# Patient Record
Sex: FEMALE | Race: WHITE | NOT HISPANIC OR LATINO | Employment: OTHER | URBAN - METROPOLITAN AREA
[De-identification: names, ages, dates, MRNs, and addresses within clinical notes are randomized per-mention and may not be internally consistent; named-entity substitution may affect disease eponyms.]

---

## 2017-01-01 ENCOUNTER — OFFICE VISIT (OUTPATIENT)
Dept: FAMILY MEDICINE | Facility: CLINIC | Age: 82
End: 2017-01-01
Payer: MEDICARE

## 2017-01-01 ENCOUNTER — PATIENT MESSAGE (OUTPATIENT)
Dept: ELECTROPHYSIOLOGY | Facility: CLINIC | Age: 82
End: 2017-01-01

## 2017-01-01 ENCOUNTER — OFFICE VISIT (OUTPATIENT)
Dept: INTERNAL MEDICINE | Facility: CLINIC | Age: 82
End: 2017-01-01
Payer: MEDICARE

## 2017-01-01 ENCOUNTER — OFFICE VISIT (OUTPATIENT)
Dept: FAMILY MEDICINE | Facility: CLINIC | Age: 82
End: 2017-01-01
Attending: FAMILY MEDICINE
Payer: MEDICARE

## 2017-01-01 ENCOUNTER — HOSPITAL ENCOUNTER (EMERGENCY)
Facility: HOSPITAL | Age: 82
Discharge: HOME | End: 2017-03-01
Attending: EMERGENCY MEDICINE
Payer: MEDICARE

## 2017-01-01 ENCOUNTER — OFFICE VISIT (OUTPATIENT)
Dept: ELECTROPHYSIOLOGY | Facility: CLINIC | Age: 82
End: 2017-01-01
Payer: MEDICARE

## 2017-01-01 ENCOUNTER — CLINICAL SUPPORT (OUTPATIENT)
Dept: ELECTROPHYSIOLOGY | Facility: CLINIC | Age: 82
End: 2017-01-01
Payer: MEDICARE

## 2017-01-01 ENCOUNTER — TELEPHONE (OUTPATIENT)
Dept: FAMILY MEDICINE | Facility: CLINIC | Age: 82
End: 2017-01-01

## 2017-01-01 ENCOUNTER — LAB VISIT (OUTPATIENT)
Dept: LAB | Facility: HOSPITAL | Age: 82
End: 2017-01-01
Attending: FAMILY MEDICINE
Payer: MEDICARE

## 2017-01-01 ENCOUNTER — PATIENT MESSAGE (OUTPATIENT)
Dept: FAMILY MEDICINE | Facility: CLINIC | Age: 82
End: 2017-01-01

## 2017-01-01 ENCOUNTER — TELEPHONE (OUTPATIENT)
Dept: ELECTROPHYSIOLOGY | Facility: CLINIC | Age: 82
End: 2017-01-01

## 2017-01-01 ENCOUNTER — LAB VISIT (OUTPATIENT)
Dept: LAB | Facility: HOSPITAL | Age: 82
End: 2017-01-01
Attending: INTERNAL MEDICINE
Payer: MEDICARE

## 2017-01-01 ENCOUNTER — HOSPITAL ENCOUNTER (OUTPATIENT)
Dept: CARDIOLOGY | Facility: CLINIC | Age: 82
Discharge: HOME | End: 2017-11-08
Payer: MEDICARE

## 2017-01-01 VITALS
BODY MASS INDEX: 38.22 KG/M2 | HEART RATE: 62 BPM | WEIGHT: 194.69 LBS | OXYGEN SATURATION: 90 % | HEIGHT: 60 IN | DIASTOLIC BLOOD PRESSURE: 64 MMHG | SYSTOLIC BLOOD PRESSURE: 107 MMHG

## 2017-01-01 VITALS
WEIGHT: 189.81 LBS | BODY MASS INDEX: 37.27 KG/M2 | OXYGEN SATURATION: 98 % | SYSTOLIC BLOOD PRESSURE: 121 MMHG | DIASTOLIC BLOOD PRESSURE: 64 MMHG | HEART RATE: 82 BPM | HEIGHT: 60 IN

## 2017-01-01 VITALS
HEIGHT: 60 IN | OXYGEN SATURATION: 100 % | DIASTOLIC BLOOD PRESSURE: 60 MMHG | BODY MASS INDEX: 35.34 KG/M2 | HEART RATE: 69 BPM | TEMPERATURE: 98 F | SYSTOLIC BLOOD PRESSURE: 163 MMHG | RESPIRATION RATE: 16 BRPM | WEIGHT: 180 LBS

## 2017-01-01 VITALS
HEART RATE: 69 BPM | DIASTOLIC BLOOD PRESSURE: 60 MMHG | SYSTOLIC BLOOD PRESSURE: 127 MMHG | WEIGHT: 193.56 LBS | HEIGHT: 60 IN | BODY MASS INDEX: 38 KG/M2

## 2017-01-01 VITALS
DIASTOLIC BLOOD PRESSURE: 90 MMHG | WEIGHT: 192.44 LBS | HEIGHT: 60 IN | SYSTOLIC BLOOD PRESSURE: 176 MMHG | HEART RATE: 60 BPM | BODY MASS INDEX: 37.78 KG/M2

## 2017-01-01 VITALS
WEIGHT: 189.38 LBS | OXYGEN SATURATION: 92 % | DIASTOLIC BLOOD PRESSURE: 78 MMHG | HEART RATE: 60 BPM | HEIGHT: 60 IN | BODY MASS INDEX: 37.18 KG/M2 | SYSTOLIC BLOOD PRESSURE: 139 MMHG

## 2017-01-01 DIAGNOSIS — Z95.0 CARDIAC PACEMAKER IN SITU: ICD-10-CM

## 2017-01-01 DIAGNOSIS — D53.9 NUTRITIONAL ANEMIA: ICD-10-CM

## 2017-01-01 DIAGNOSIS — D64.9 ANEMIA, UNSPECIFIED TYPE: ICD-10-CM

## 2017-01-01 DIAGNOSIS — M81.0 OSTEOPOROSIS: ICD-10-CM

## 2017-01-01 DIAGNOSIS — I13.0 BENIGN HYPERTENSIVE HEART AND KIDNEY DISEASE WITH DIASTOLIC CHF, NYHA CLASS II AND CKD STAGE III: ICD-10-CM

## 2017-01-01 DIAGNOSIS — N18.30 CKD (CHRONIC KIDNEY DISEASE), STAGE III: ICD-10-CM

## 2017-01-01 DIAGNOSIS — N25.81 SECONDARY HYPERPARATHYROIDISM: ICD-10-CM

## 2017-01-01 DIAGNOSIS — Z83.49 FAMILY HISTORY OF THYROID DISEASE: ICD-10-CM

## 2017-01-01 DIAGNOSIS — R10.9 ABDOMINAL PAIN: ICD-10-CM

## 2017-01-01 DIAGNOSIS — I13.0 BENIGN HYPERTENSIVE HEART AND KIDNEY DISEASE WITH DIASTOLIC CHF, NYHA CLASS II AND CKD STAGE III: Primary | ICD-10-CM

## 2017-01-01 DIAGNOSIS — N18.30 BENIGN HYPERTENSIVE HEART AND KIDNEY DISEASE WITH DIASTOLIC CHF, NYHA CLASS II AND CKD STAGE III: ICD-10-CM

## 2017-01-01 DIAGNOSIS — I10 HTN (HYPERTENSION), BENIGN: ICD-10-CM

## 2017-01-01 DIAGNOSIS — N18.30 BENIGN HYPERTENSIVE HEART AND KIDNEY DISEASE WITH DIASTOLIC CHF, NYHA CLASS II AND CKD STAGE III: Primary | ICD-10-CM

## 2017-01-01 DIAGNOSIS — J01.01 ACUTE RECURRENT MAXILLARY SINUSITIS: Primary | ICD-10-CM

## 2017-01-01 DIAGNOSIS — R53.83 FATIGUE, UNSPECIFIED TYPE: ICD-10-CM

## 2017-01-01 DIAGNOSIS — A09 DIARRHEA OF INFECTIOUS ORIGIN: ICD-10-CM

## 2017-01-01 DIAGNOSIS — I50.30 BENIGN HYPERTENSIVE HEART AND KIDNEY DISEASE WITH DIASTOLIC CHF, NYHA CLASS II AND CKD STAGE III: Primary | ICD-10-CM

## 2017-01-01 DIAGNOSIS — R79.89 ABNORMAL TSH: ICD-10-CM

## 2017-01-01 DIAGNOSIS — M81.0 OSTEOPOROSIS, UNSPECIFIED OSTEOPOROSIS TYPE, UNSPECIFIED PATHOLOGICAL FRACTURE PRESENCE: ICD-10-CM

## 2017-01-01 DIAGNOSIS — I44.1 MOBITZ TYPE 1 SECOND DEGREE AV BLOCK: ICD-10-CM

## 2017-01-01 DIAGNOSIS — E78.2 HYPERLIPIDEMIA, MIXED: ICD-10-CM

## 2017-01-01 DIAGNOSIS — K21.9 GASTROESOPHAGEAL REFLUX DISEASE WITHOUT ESOPHAGITIS: ICD-10-CM

## 2017-01-01 DIAGNOSIS — I26.99 OTHER PULMONARY EMBOLISM WITHOUT ACUTE COR PULMONALE, UNSPECIFIED CHRONICITY: ICD-10-CM

## 2017-01-01 DIAGNOSIS — I51.89 DIASTOLIC DYSFUNCTION: ICD-10-CM

## 2017-01-01 DIAGNOSIS — E66.9 OBESITY, CLASS II, BMI 35-39.9: ICD-10-CM

## 2017-01-01 DIAGNOSIS — I44.1 AV BLOCK, MOBITZ 1: ICD-10-CM

## 2017-01-01 DIAGNOSIS — K30 INDIGESTION: Primary | ICD-10-CM

## 2017-01-01 DIAGNOSIS — M17.0 PRIMARY OSTEOARTHRITIS OF BOTH KNEES: ICD-10-CM

## 2017-01-01 DIAGNOSIS — E66.01 SEVERE OBESITY (BMI 35.0-35.9 WITH COMORBIDITY): ICD-10-CM

## 2017-01-01 DIAGNOSIS — R19.7 DIARRHEA, UNSPECIFIED TYPE: Primary | ICD-10-CM

## 2017-01-01 DIAGNOSIS — I49.5 SSS (SICK SINUS SYNDROME): ICD-10-CM

## 2017-01-01 DIAGNOSIS — R26.81 GAIT INSTABILITY: ICD-10-CM

## 2017-01-01 DIAGNOSIS — Z95.0 CARDIAC PACEMAKER IN SITU: Primary | ICD-10-CM

## 2017-01-01 DIAGNOSIS — I44.1 AV BLOCK, MOBITZ 1: Primary | ICD-10-CM

## 2017-01-01 DIAGNOSIS — I50.30 BENIGN HYPERTENSIVE HEART AND KIDNEY DISEASE WITH DIASTOLIC CHF, NYHA CLASS II AND CKD STAGE III: ICD-10-CM

## 2017-01-01 DIAGNOSIS — D51.9 ANEMIA DUE TO VITAMIN B12 DEFICIENCY, UNSPECIFIED B12 DEFICIENCY TYPE: ICD-10-CM

## 2017-01-01 DIAGNOSIS — D64.9 ANEMIA, UNSPECIFIED TYPE: Primary | ICD-10-CM

## 2017-01-01 DIAGNOSIS — I44.1 MOBITZ TYPE 1 SECOND DEGREE AV BLOCK: Primary | ICD-10-CM

## 2017-01-01 DIAGNOSIS — I44.2 CHB (COMPLETE HEART BLOCK): ICD-10-CM

## 2017-01-01 LAB
25(OH)D3+25(OH)D2 SERPL-MCNC: 31 NG/ML
25(OH)D3+25(OH)D2 SERPL-MCNC: 44 NG/ML
ALBUMIN SERPL BCP-MCNC: 2.8 G/DL
ALBUMIN SERPL BCP-MCNC: 3.2 G/DL
ALBUMIN SERPL BCP-MCNC: 3.4 G/DL
ALP SERPL-CCNC: 101 U/L
ALP SERPL-CCNC: 101 U/L
ALP SERPL-CCNC: 75 U/L
ALT SERPL W/O P-5'-P-CCNC: 23 U/L
ALT SERPL W/O P-5'-P-CCNC: 8 U/L
ALT SERPL W/O P-5'-P-CCNC: 9 U/L
ANION GAP SERPL CALC-SCNC: 8 MMOL/L
ANION GAP SERPL CALC-SCNC: 9 MMOL/L
ANION GAP SERPL CALC-SCNC: 9 MMOL/L
AST SERPL-CCNC: 12 U/L
AST SERPL-CCNC: 15 U/L
AST SERPL-CCNC: 17 U/L
BACTERIA #/AREA URNS HPF: ABNORMAL /HPF
BASOPHILS # BLD AUTO: 0.02 K/UL
BASOPHILS # BLD AUTO: 0.02 K/UL
BASOPHILS # BLD AUTO: 0.03 K/UL
BASOPHILS NFR BLD: 0.2 %
BASOPHILS NFR BLD: 0.2 %
BASOPHILS NFR BLD: 0.4 %
BILIRUB SERPL-MCNC: 0.3 MG/DL
BILIRUB SERPL-MCNC: 0.3 MG/DL
BILIRUB SERPL-MCNC: 0.6 MG/DL
BILIRUB UR QL STRIP: NEGATIVE
BUN SERPL-MCNC: 20 MG/DL
BUN SERPL-MCNC: 20 MG/DL
BUN SERPL-MCNC: 23 MG/DL
CALCIUM SERPL-MCNC: 8.9 MG/DL
CALCIUM SERPL-MCNC: 9.3 MG/DL
CALCIUM SERPL-MCNC: 9.4 MG/DL
CHLORIDE SERPL-SCNC: 104 MMOL/L
CHLORIDE SERPL-SCNC: 105 MMOL/L
CHLORIDE SERPL-SCNC: 107 MMOL/L
CHOLEST/HDLC SERPL: 4.3 {RATIO}
CLARITY UR: CLEAR
CO2 SERPL-SCNC: 23 MMOL/L
CO2 SERPL-SCNC: 26 MMOL/L
CO2 SERPL-SCNC: 28 MMOL/L
COLOR UR: YELLOW
CREAT SERPL-MCNC: 1 MG/DL
CREAT SERPL-MCNC: 1.1 MG/DL
CREAT SERPL-MCNC: 1.1 MG/DL
DIFFERENTIAL METHOD: ABNORMAL
EOSINOPHIL # BLD AUTO: 0.1 K/UL
EOSINOPHIL NFR BLD: 0.8 %
EOSINOPHIL NFR BLD: 1.1 %
EOSINOPHIL NFR BLD: 1.3 %
ERYTHROCYTE [DISTWIDTH] IN BLOOD BY AUTOMATED COUNT: 12.2 %
ERYTHROCYTE [DISTWIDTH] IN BLOOD BY AUTOMATED COUNT: 12.6 %
ERYTHROCYTE [DISTWIDTH] IN BLOOD BY AUTOMATED COUNT: 12.7 %
EST. GFR  (AFRICAN AMERICAN): 52 ML/MIN/1.73 M^2
EST. GFR  (AFRICAN AMERICAN): 52 ML/MIN/1.73 M^2
EST. GFR  (AFRICAN AMERICAN): 58.5 ML/MIN/1.73 M^2
EST. GFR  (NON AFRICAN AMERICAN): 45 ML/MIN/1.73 M^2
EST. GFR  (NON AFRICAN AMERICAN): 45 ML/MIN/1.73 M^2
EST. GFR  (NON AFRICAN AMERICAN): 50.8 ML/MIN/1.73 M^2
GLUCOSE SERPL-MCNC: 93 MG/DL
GLUCOSE SERPL-MCNC: 93 MG/DL
GLUCOSE SERPL-MCNC: 99 MG/DL
GLUCOSE UR QL STRIP: NEGATIVE
HCT VFR BLD AUTO: 36.2 %
HCT VFR BLD AUTO: 39 %
HCT VFR BLD AUTO: 39.7 %
HDL/CHOLESTEROL RATIO: 23.4 %
HDLC SERPL-MCNC: 197 MG/DL
HDLC SERPL-MCNC: 46 MG/DL
HGB BLD-MCNC: 11.5 G/DL
HGB BLD-MCNC: 12.3 G/DL
HGB BLD-MCNC: 12.5 G/DL
HGB UR QL STRIP: ABNORMAL
KETONES UR QL STRIP: NEGATIVE
LDLC SERPL CALC-MCNC: 109.8 MG/DL
LEUKOCYTE ESTERASE UR QL STRIP: ABNORMAL
LIPASE SERPL-CCNC: <3 U/L
LYMPHOCYTES # BLD AUTO: 1.3 K/UL
LYMPHOCYTES # BLD AUTO: 1.9 K/UL
LYMPHOCYTES # BLD AUTO: 2 K/UL
LYMPHOCYTES NFR BLD: 13.9 %
LYMPHOCYTES NFR BLD: 24.3 %
LYMPHOCYTES NFR BLD: 24.8 %
MCH RBC QN AUTO: 31.1 PG
MCH RBC QN AUTO: 31.4 PG
MCH RBC QN AUTO: 31.8 PG
MCHC RBC AUTO-ENTMCNC: 31.5 %
MCHC RBC AUTO-ENTMCNC: 31.5 %
MCHC RBC AUTO-ENTMCNC: 31.8 %
MCV RBC AUTO: 100 FL
MCV RBC AUTO: 100 FL
MCV RBC AUTO: 99 FL
MICROSCOPIC COMMENT: ABNORMAL
MONOCYTES # BLD AUTO: 0.4 K/UL
MONOCYTES # BLD AUTO: 0.4 K/UL
MONOCYTES # BLD AUTO: 1.1 K/UL
MONOCYTES NFR BLD: 11.9 %
MONOCYTES NFR BLD: 4.5 %
MONOCYTES NFR BLD: 5.4 %
NEUTROPHILS # BLD AUTO: 5.1 K/UL
NEUTROPHILS # BLD AUTO: 5.7 K/UL
NEUTROPHILS # BLD AUTO: 6.6 K/UL
NEUTROPHILS NFR BLD: 67.8 %
NEUTROPHILS NFR BLD: 69.3 %
NEUTROPHILS NFR BLD: 73 %
NITRITE UR QL STRIP: NEGATIVE
NONHDLC SERPL-MCNC: 151 MG/DL
PH UR STRIP: 6 [PH] (ref 5–8)
PLATELET # BLD AUTO: 201 K/UL
PLATELET # BLD AUTO: 291 K/UL
PLATELET # BLD AUTO: 365 K/UL
PMV BLD AUTO: 10 FL
PMV BLD AUTO: 10.8 FL
PMV BLD AUTO: 11 FL
POTASSIUM SERPL-SCNC: 4.1 MMOL/L
POTASSIUM SERPL-SCNC: 4.5 MMOL/L
POTASSIUM SERPL-SCNC: 4.7 MMOL/L
PROT SERPL-MCNC: 5.9 G/DL
PROT SERPL-MCNC: 6.2 G/DL
PROT SERPL-MCNC: 6.5 G/DL
PROT UR QL STRIP: NEGATIVE
PTH-INTACT SERPL-MCNC: 82 PG/ML
RBC # BLD AUTO: 3.62 M/UL
RBC # BLD AUTO: 3.92 M/UL
RBC # BLD AUTO: 4.02 M/UL
RBC #/AREA URNS HPF: 4 /HPF (ref 0–4)
SODIUM SERPL-SCNC: 136 MMOL/L
SODIUM SERPL-SCNC: 141 MMOL/L
SODIUM SERPL-SCNC: 142 MMOL/L
SP GR UR STRIP: 1.01 (ref 1–1.03)
SQUAMOUS #/AREA URNS HPF: ABNORMAL /HPF
THYROPEROXIDASE IGG SERPL-ACNC: <6 IU/ML
TRIGL SERPL-MCNC: 206 MG/DL
TSH SERPL DL<=0.005 MIU/L-ACNC: 2.81 UIU/ML
TSH SERPL DL<=0.005 MIU/L-ACNC: 3.32 UIU/ML
URN SPEC COLLECT METH UR: ABNORMAL
UROBILINOGEN UR STRIP-ACNC: NEGATIVE EU/DL
VIT B12 SERPL-MCNC: 405 PG/ML
WBC # BLD AUTO: 7.58 K/UL
WBC # BLD AUTO: 8.19 K/UL
WBC # BLD AUTO: 9.01 K/UL
WBC #/AREA URNS HPF: 2 /HPF (ref 0–5)

## 2017-01-01 PROCEDURE — 1126F AMNT PAIN NOTED NONE PRSNT: CPT | Mod: S$GLB,,, | Performed by: INTERNAL MEDICINE

## 2017-01-01 PROCEDURE — 1159F MED LIST DOCD IN RCRD: CPT | Mod: S$GLB,,, | Performed by: FAMILY MEDICINE

## 2017-01-01 PROCEDURE — 1159F MED LIST DOCD IN RCRD: CPT | Mod: S$GLB,,, | Performed by: INTERNAL MEDICINE

## 2017-01-01 PROCEDURE — 36415 COLL VENOUS BLD VENIPUNCTURE: CPT | Mod: PO

## 2017-01-01 PROCEDURE — 1157F ADVNC CARE PLAN IN RCRD: CPT | Mod: S$GLB,,, | Performed by: FAMILY MEDICINE

## 2017-01-01 PROCEDURE — 80053 COMPREHEN METABOLIC PANEL: CPT

## 2017-01-01 PROCEDURE — 99499 UNLISTED E&M SERVICE: CPT | Mod: S$GLB,,, | Performed by: INTERNAL MEDICINE

## 2017-01-01 PROCEDURE — 96360 HYDRATION IV INFUSION INIT: CPT

## 2017-01-01 PROCEDURE — 1126F AMNT PAIN NOTED NONE PRSNT: CPT | Mod: S$GLB,,, | Performed by: FAMILY MEDICINE

## 2017-01-01 PROCEDURE — 99499 UNLISTED E&M SERVICE: CPT | Mod: S$GLB,,, | Performed by: FAMILY MEDICINE

## 2017-01-01 PROCEDURE — 83690 ASSAY OF LIPASE: CPT

## 2017-01-01 PROCEDURE — 99999 PR PBB SHADOW E&M-EST. PATIENT-LVL III: CPT | Mod: PBBFAC,,, | Performed by: INTERNAL MEDICINE

## 2017-01-01 PROCEDURE — 1160F RVW MEDS BY RX/DR IN RCRD: CPT | Mod: S$GLB,,, | Performed by: FAMILY MEDICINE

## 2017-01-01 PROCEDURE — 99214 OFFICE O/P EST MOD 30 MIN: CPT | Mod: S$GLB,,, | Performed by: INTERNAL MEDICINE

## 2017-01-01 PROCEDURE — 93294 REM INTERROG EVL PM/LDLS PM: CPT | Mod: S$GLB,,, | Performed by: INTERNAL MEDICINE

## 2017-01-01 PROCEDURE — 82306 VITAMIN D 25 HYDROXY: CPT

## 2017-01-01 PROCEDURE — 85025 COMPLETE CBC W/AUTO DIFF WBC: CPT

## 2017-01-01 PROCEDURE — 99214 OFFICE O/P EST MOD 30 MIN: CPT | Mod: S$GLB,,, | Performed by: FAMILY MEDICINE

## 2017-01-01 PROCEDURE — 84443 ASSAY THYROID STIM HORMONE: CPT

## 2017-01-01 PROCEDURE — 93000 ELECTROCARDIOGRAM COMPLETE: CPT | Mod: S$GLB,,, | Performed by: INTERNAL MEDICINE

## 2017-01-01 PROCEDURE — 93296 REM INTERROG EVL PM/IDS: CPT | Mod: S$GLB,,, | Performed by: INTERNAL MEDICINE

## 2017-01-01 PROCEDURE — 36415 COLL VENOUS BLD VENIPUNCTURE: CPT

## 2017-01-01 PROCEDURE — 99214 OFFICE O/P EST MOD 30 MIN: CPT | Mod: 25,S$GLB,, | Performed by: FAMILY MEDICINE

## 2017-01-01 PROCEDURE — 81000 URINALYSIS NONAUTO W/SCOPE: CPT

## 2017-01-01 PROCEDURE — 99999 PR PBB SHADOW E&M-EST. PATIENT-LVL III: CPT | Mod: PBBFAC,,, | Performed by: FAMILY MEDICINE

## 2017-01-01 PROCEDURE — 83970 ASSAY OF PARATHORMONE: CPT

## 2017-01-01 PROCEDURE — 25000003 PHARM REV CODE 250: Performed by: EMERGENCY MEDICINE

## 2017-01-01 PROCEDURE — 99284 EMERGENCY DEPT VISIT MOD MDM: CPT | Mod: 25

## 2017-01-01 PROCEDURE — 96361 HYDRATE IV INFUSION ADD-ON: CPT

## 2017-01-01 PROCEDURE — 82607 VITAMIN B-12: CPT

## 2017-01-01 PROCEDURE — 1157F ADVNC CARE PLAN IN RCRD: CPT | Mod: S$GLB,,, | Performed by: INTERNAL MEDICINE

## 2017-01-01 PROCEDURE — 96372 THER/PROPH/DIAG INJ SC/IM: CPT | Mod: S$GLB,,, | Performed by: FAMILY MEDICINE

## 2017-01-01 PROCEDURE — 80061 LIPID PANEL: CPT

## 2017-01-01 PROCEDURE — 1160F RVW MEDS BY RX/DR IN RCRD: CPT | Mod: S$GLB,,, | Performed by: INTERNAL MEDICINE

## 2017-01-01 PROCEDURE — 93280 PM DEVICE PROGR EVAL DUAL: CPT | Mod: S$GLB,,, | Performed by: INTERNAL MEDICINE

## 2017-01-01 PROCEDURE — 86376 MICROSOMAL ANTIBODY EACH: CPT

## 2017-01-01 RX ORDER — POTASSIUM CHLORIDE 20 MEQ/1
20 TABLET, EXTENDED RELEASE ORAL DAILY
Qty: 90 TABLET | Refills: 3 | Status: SHIPPED | OUTPATIENT
Start: 2017-01-01 | End: 2017-01-01 | Stop reason: SDUPTHER

## 2017-01-01 RX ORDER — POTASSIUM CHLORIDE 20 MEQ/1
20 TABLET, EXTENDED RELEASE ORAL DAILY
Qty: 90 TABLET | Refills: 1 | Status: SHIPPED | OUTPATIENT
Start: 2017-01-01

## 2017-01-01 RX ORDER — FUROSEMIDE 20 MG/1
20 TABLET ORAL DAILY
Qty: 90 TABLET | Refills: 1 | Status: ON HOLD | OUTPATIENT
Start: 2017-01-01 | End: 2018-01-01

## 2017-01-01 RX ORDER — AMOXICILLIN 875 MG/1
875 TABLET, FILM COATED ORAL EVERY 12 HOURS
Qty: 20 TABLET | Refills: 0 | Status: SHIPPED | OUTPATIENT
Start: 2017-01-01 | End: 2017-01-01

## 2017-01-01 RX ORDER — LISINOPRIL 40 MG/1
40 TABLET ORAL DAILY
Qty: 90 TABLET | Refills: 1 | Status: SHIPPED | OUTPATIENT
Start: 2017-01-01

## 2017-01-01 RX ORDER — OMEPRAZOLE 20 MG/1
20 CAPSULE, DELAYED RELEASE ORAL DAILY
Qty: 30 CAPSULE | Refills: 0 | Status: SHIPPED | OUTPATIENT
Start: 2017-01-01 | End: 2018-01-01 | Stop reason: SDUPTHER

## 2017-01-01 RX ORDER — DIPHENOXYLATE HYDROCHLORIDE AND ATROPINE SULFATE 2.5; .025 MG/1; MG/1
1 TABLET ORAL 3 TIMES DAILY PRN
Qty: 10 TABLET | Refills: 0 | Status: SHIPPED | OUTPATIENT
Start: 2017-01-01 | End: 2017-01-01

## 2017-01-01 RX ORDER — OMEPRAZOLE 20 MG/1
20 CAPSULE, DELAYED RELEASE ORAL DAILY
Qty: 30 CAPSULE | Refills: 0 | Status: SHIPPED | OUTPATIENT
Start: 2017-01-01 | End: 2017-01-01 | Stop reason: SDUPTHER

## 2017-01-01 RX ORDER — TRIAMCINOLONE ACETONIDE 40 MG/ML
40 INJECTION, SUSPENSION INTRA-ARTICULAR; INTRAMUSCULAR
Status: COMPLETED | OUTPATIENT
Start: 2017-01-01 | End: 2017-01-01

## 2017-01-01 RX ORDER — DIPHENOXYLATE HYDROCHLORIDE AND ATROPINE SULFATE 2.5; .025 MG/1; MG/1
1 TABLET ORAL
Status: COMPLETED | OUTPATIENT
Start: 2017-01-01 | End: 2017-01-01

## 2017-01-01 RX ORDER — FUROSEMIDE 20 MG/1
20 TABLET ORAL DAILY
Qty: 90 TABLET | Refills: 3 | Status: SHIPPED | OUTPATIENT
Start: 2017-01-01 | End: 2017-01-01 | Stop reason: SDUPTHER

## 2017-01-01 RX ORDER — LISINOPRIL 40 MG/1
40 TABLET ORAL DAILY
Qty: 90 TABLET | Refills: 4 | Status: SHIPPED | OUTPATIENT
Start: 2017-01-01 | End: 2017-01-01 | Stop reason: SDUPTHER

## 2017-01-01 RX ADMIN — DIPHENOXYLATE HYDROCHLORIDE AND ATROPINE SULFATE 1 TABLET: 2.5; .025 TABLET ORAL at 02:03

## 2017-01-01 RX ADMIN — TRIAMCINOLONE ACETONIDE 40 MG: 40 INJECTION, SUSPENSION INTRA-ARTICULAR; INTRAMUSCULAR at 01:06

## 2017-01-01 RX ADMIN — SODIUM CHLORIDE 1000 ML: 0.9 INJECTION, SOLUTION INTRAVENOUS at 02:03

## 2017-03-01 NOTE — ED NOTES
Pt reports diarrhea since Sunday. Denies N/V, fever, and respiratory symptoms. States that she does occasionally take Miralax, but last dose was on Thursday. Also, c/o increased gas/belching. VSS. NAD.     APPEARANCE: Alert, oriented and in no acute distress.  CARDIAC: Normal rate and rhythm, no murmur heard.   PERIPHERAL VASCULAR: peripheral pulses present. Normal cap refill. No edema. Warm to touch.    RESPIRATORY:Normal rate and effort, breath sounds clear bilaterally throughout chest. Respirations are equal and unlabored no obvious signs of distress.  GASTRO: soft, bowel sounds normal, no tenderness, no abdominal distention. +diarrhea  MUSC: Full ROM. No bony tenderness or soft tissue tenderness. No obvious deformity.  SKIN: Skin is warm and dry, normal skin turgor, mucous membranes moist.  NEURO: 5/5 strength major flexors/extensors bilaterally. Sensory intact to light touch bilaterally. Fernando coma scale: eyes open spontaneously-4, oriented & converses-5, obeys commands-6. No neurological abnormalities.   MENTAL STATUS: awake, alert and aware of environment.  EYE: PERRL, both eyes: pupils brisk and reactive to light. Normal size.  ENT: EARS: no obvious drainage. NOSE: no active bleeding.

## 2017-03-01 NOTE — ED AVS SNAPSHOT
[facility]  [address] [facility] [address]               Esha Torres   3/1/2017  1:31 PM   ED    Description:  Female : 1929   Department:  [facility]           Your Care was Coordinated By:     Provider Role From To    John Conteh DO Attending Provider 17 1338 --      Reason for Visit     Diarrhea           Diagnoses this Visit        Comments    Diarrhea, unspecified type    -  Primary     Abdominal pain           ED Disposition     None           To Do List           Follow-up Information     Follow up with Chad Barrera MD. Schedule an appointment as soon as possible for a visit in 2 days.    Specialty:  Internal Medicine    Contact information:    [address]  [address]  [place] [address]  [phone]          Go to [facility].    Specialty:  Emergency Medicine    Why:  If symptoms worsen    Contact information:    [address] [place] [address] [phone]       These Medications        Disp Refills Start End    diphenoxylate-atropine 2.5-0.025 mg (LOMOTIL) 2.5-0.025 mg per tablet 10 tablet 0 3/1/2017 3/11/2017    Take 1 tablet by mouth 3 (three) times daily as needed for Diarrhea. - Oral    [address] Ph #: [phone]         [facility]     [facility] -  Assistance  Registered nurses in the [facility] provide clinical advisement, health education, appointment booking, and other advisory services.  Call for this free service at [phone].             Medications           Message regarding Medications     Verify the changes and/or additions to your medication regime listed below are the same as discussed with your clinician today.  If any of these changes or additions are incorrect, please notify your healthcare provider.        START taking  these NEW medications        Refills    diphenoxylate-atropine 2.5-0.025 mg (LOMOTIL) 2.5-0.025 mg per tablet 0    Sig: Take 1 tablet by mouth 3 (three) times daily as needed for Diarrhea.    Class: Print    Route: Oral      These medications were administered today        Dose Freq    sodium chloride 0.9% bolus 1,000 mL 1,000 mL ED 1 Time    Sig: Inject 1,000 mLs into the vein ED 1 Time.    Class: Normal    Route: Intravenous    diphenoxylate-atropine 2.5-0.025 mg per tablet 1 tablet 1 tablet ED 1 Time    Sig: Take 1 tablet by mouth ED 1 Time.    Class: Normal    Route: Oral           Verify that the below list of medications is an accurate representation of the medications you are currently taking.  If none reported, the list may be blank. If incorrect, please contact your healthcare provider. Carry this list with you in case of emergency.           Current Medications     aspirin 81 MG Chew Take 1 tablet (81 mg total) by mouth once daily.    diclofenac sodium (VOLTAREN) 1 % Gel Apply 2 g topically once daily.    diphenoxylate-atropine 2.5-0.025 mg (LOMOTIL) 2.5-0.025 mg per tablet Take 1 tablet by mouth 3 (three) times daily as needed for Diarrhea.    ERGOCALCIFEROL, VITAMIN D2, (VITAMIN D ORAL) Take by mouth once daily.     FLUZONE HIGH-DOSE 2016-17, PF, 180 mcg/0.5 mL Syrg     furosemide (LASIX) 20 MG tablet Take 1 tablet (20 mg total) by mouth once daily.    lisinopril (PRINIVIL,ZESTRIL) 40 MG tablet Take 1 tablet (40 mg total) by mouth once daily.    potassium chloride SA (K-DUR,KLOR-CON) 20 MEQ tablet Take 1 tablet (20 mEq total) by mouth once daily.    ZOSTAVAX, PF, 19,400 unit/0.65 mL injection            Clinical Reference Information           Your Vitals Were     BP                   163/60           Allergies as of 3/1/2017        Reactions    Hydrochlorothiazide Diarrhea      Immunizations Administered on Date of Encounter - 3/1/2017     None      ED Micro, Lab, POCT     Start Ordered       Status  Ordering Provider    03/01/17 1415 03/01/17 1414  WBC, Stool  Once      In process     03/01/17 1415 03/01/17 1414  Clostridium difficile EIA  Once      In process     03/01/17 1414 03/01/17 1414  Stool culture **CANNOT BE ORDERED STAT**  Once      In process     03/01/17 1414 03/01/17 1414  E. coli 0157 antigen  Once      In process     03/01/17 1411 03/01/17 1410  Lipase  STAT      Final result     03/01/17 1410 03/01/17 1410  CBC auto differential  STAT      Final result     03/01/17 1410 03/01/17 1410  Comprehensive metabolic panel  STAT      Final result       ED Imaging Orders     Start Ordered       Status Ordering Provider    03/01/17 1411 03/01/17 1410  X-Ray Abdomen Flat And Erect  1 time imaging      In process         Discharge Instructions         Uncertain Causes of Diarrhea (Adult)    Diarrhea is when stools are loose and watery. This can be caused by:  · Viral infections  · Bacterial infections  · Food poisoning  · Parasites  · Irritable bowel syndrome (IBS)  · Inflammatory bowel diseases such as ulcerative colitis, Crohn's disease, and celiac disease  · Food intolerance, such as to lactose, the sugar found in milk and milk products  · Reaction to medicines like antibiotics, laxatives, cancer drugs, and antacids  Along with diarrhea, you may also have:  · Abdominal pain and cramping  · Nausea and vomiting  · Loss of bowel control  · Fever and chills  · Bloody stools  In some cases, antibiotics may help to treat diarrhea. You may have a stool sample test. This is done to see what is causing your diarrhea, and if antibiotics will help treat it. The results of a stool sample test may take up to 2 days. The healthcare provider may not give you antibiotics until he or she has the stool test results.  Diarrhea can cause dehydration. This is the loss of too much water and other fluids from the body. When this occurs, body fluid must be replaced. This can be done with oral rehydration solutions. Oral  rehydration solutions are available at drugstores and grocery stores without a prescription.  Home care  Follow all instructions given by your healthcare provider. Rest at home for the next 24 hours, or until you feel better. Avoid caffeine, tobacco, and alcohol. These can make diarrhea, cramping, and pain worse.  If taking medicines:  · Dont take over-the-counter diarrhea or nausea medicines unless your healthcare provider tells you to.  · You may use acetaminophen or NSAID medicines like ibuprofen or naproxen to reduce pain and fever. Dont use these if you have chronic liver or kidney disease, or ever had a stomach ulcer or gastrointestinal bleeding. Don't use NSAID medicines if you are already taking one for another condition (like arthritis) or are on daily aspirin therapy (such as for heart disease or after a stroke). Talk with your healthcare provider first.  · If antibiotics were prescribed, be sure you take them until they are finished. Dont stop taking them even when you feel better. Antibiotics must be taken as a full course.  To prevent the spread of illness:  · Remember that washing with soap and water and using alcohol-based  is the best way to prevent the spread of infection.  · Clean the toilet after each use.  · Wash your hands before eating.  · Wash your hands before and after preparing food. Keep in mind that people with diarrhea or vomiting should not prepare food for others.  · Wash your hands after using cutting boards, countertops, and knives that have been in contact with raw foods.  · Wash and then peel fruits and vegetables.  · Keep uncooked meats away from cooked and ready-to-eat foods.  · Use a food thermometer when cooking. Cook poultry to at least 165°F (74°C). Cook ground meat (beef, veal, pork, lamb) to at least 160°F (71°C). Cook fresh beef, veal, lamb, and pork to at least 145°F (63°C).  · Dont eat raw or undercooked eggs (poached or giuseppe side up), poultry, meat, or  unpasteurized milk and juices.  Food and drinks  The main goal while treating vomiting or diarrhea is to prevent dehydration. This is done by taking small amounts of liquids often.  · Keep in mind that liquids are more important than food right now.  · Drink only small amounts of liquids at a time.  · Dont force yourself to eat, especially if you are having cramping, vomiting, or diarrhea. Dont eat large amounts at a time, even if you are hungry.  · If you eat, avoid fatty, greasy, spicy, or fried foods.  · Dont eat dairy foods or drink milk if you have diarrhea. These can make diarrhea worse.  During the first 24 hours you can try:  · Oral rehydration solutions. Do not use sports drinks. They have too much sugar and not enough electrolytes.  · Soft drinks without caffeine  · Ginger ale  · Water (plain or flavored)  · Decaf tea or coffee  · Clear broth, consommé, or bouillon  · Gelatin, popsicles, or frozen fruit juice bars  The second 24 hours, if you are feeling better, you can add:  · Hot cereal, plain toast, bread, rolls, or crackers  · Plain noodles, rice, mashed potatoes, chicken noodle soup, or rice soup  · Unsweetened canned fruit (no pineapple)  · Bananas  As you recover:  · Limit fat intake to less than 15 grams per day. Dont eat margarine, butter, oils, mayonnaise, sauces, gravies, fried foods, peanut butter, meat, poultry, or fish.  · Limit fiber. Dont eat raw or cooked vegetables, fresh fruits except bananas, or bran cereals.  · Limit caffeine and chocolate.  · Limit dairy.  · Dont use spices or seasonings except salt.  · Go back to your normal diet over time, as you feel better and your symptoms improve.  · If the symptoms come back, go back to a simple diet or clear liquids.  Follow-up care  Follow up with your healthcare provider, or as advised. If a stool sample was taken or cultures were done, call the healthcare provider for the results as instructed.  Call 911  Call 911 if you have any of  these symptoms:  · Trouble breathing  · Confusion  · Extreme drowsiness or trouble walking  · Loss of consciousness  · Rapid heart rate  · Chest pain  · Stiff neck  · Seizure  When to seek medical advice  Call your healthcare provider right away if any of these occur:  · Abdominal pain that gets worse  · Constant lower right abdominal pain  · Continued vomiting and inability to keep liquids down  · Diarrhea more than 5 times a day  · Blood in vomit or stool  · Dark urine or no urine for 8 hours, dry mouth and tongue, tiredness, weakness, or dizziness  · Drowsiness  · New rash  · You dont get better in 2 to 3 days  · Fever of 100.4°F (38°C) or higher that doesnt get lower with medicine  Date Last Reviewed: 1/3/2016  © 0574-6300 [employer]. [address] [place] [address], [place] [address]. All rights reserved. This information is not intended as a substitute for professional medical care. Always follow your healthcare professional's instructions.           [facility] complies with applicable Federal civil rights laws and does not discriminate on the basis of race, color, national origin, age, disability, or sex.        Language Assistance Services     ATTENTION: Language assistance services are available, free of charge. Please call [phone].      ATENCIÓN: Si habla español, tiene a ramachandran disposición servicios gratuitos de asistencia lingüística. Llame al [phone].     CHÚ Ý: N?u b?n nói Ti?ng Vi?t, có các d?ch v? h? tr? ngôn ng? mi?n phí dành cho b?n. G?i s? [phone].
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
## 2017-03-01 NOTE — DISCHARGE INSTRUCTIONS

## 2017-03-03 NOTE — TELEPHONE ENCOUNTER
----- Message from Sonia Mathews sent at 3/3/2017  8:05 AM CST -----  Contact: [phone]/Gricelda Paris daughter would like for her mother to be seen today for severe stomach / bowel /eating pain/problems / started Sunday night and ongoing/Please advise.

## 2017-03-06 NOTE — MR AVS SNAPSHOT
[facility]  [address]  [place] [address]  Phone: [phone]  Fax: [phone]                  Esha Torres   3/6/2017 11:40 AM   Office Visit    Description:  Female : 1929   Provider:  Christina Segundo MD   Department:  [facility]           Reason for Visit     Diarrhea           Diagnoses this Visit        Comments    Anemia, unspecified type    -  Primary     Anemia due to vitamin B12 deficiency, unspecified B12 deficiency type         Nutritional anemia         Diarrhea of infectious origin                To Do List           Future Appointments        Provider Department Dept Phone    3/6/2017 2:10 PM SAME DAY LAB, [facility] [phone]    3/6/2017 2:20 PM SAME DAY [facility], [facility] [phone]    3/16/2017 10:40 AM Chad Barrera MD [facility] [phone]      Goals (5 Years of Data)     None      Follow-Up and Disposition     Return if symptoms worsen or fail to improve.      [facility]     [facility] Care [facility] -  Assistance  Registered nurses in the [facility] provide clinical advisement, health education, appointment booking, and other advisory services.  Call for this free service at [phone].             Medications           Message regarding Medications     Verify the changes and/or additions to your medication regime listed below are the same as discussed with your clinician today.  If any of these changes or additions are incorrect, please notify your healthcare provider.        STOP taking these medications     diclofenac sodium (VOLTAREN) 1 % Gel Apply 2 g topically once daily.           Verify that the below list of medications is an accurate representation of the medications you are currently taking.  If none reported, the list may be blank. If incorrect, please contact your healthcare provider. Carry this  list with you in case of emergency.           Current Medications     diphenoxylate-atropine 2.5-0.025 mg (LOMOTIL) 2.5-0.025 mg per tablet Take 1 tablet by mouth 3 (three) times daily as needed for Diarrhea.    ERGOCALCIFEROL, VITAMIN D2, (VITAMIN D ORAL) Take by mouth once daily.     FLUZONE HIGH-DOSE 2016-17, PF, 180 mcg/0.5 mL Syrg     furosemide (LASIX) 20 MG tablet Take 1 tablet (20 mg total) by mouth once daily.    lisinopril (PRINIVIL,ZESTRIL) 40 MG tablet Take 1 tablet (40 mg total) by mouth once daily.    potassium chloride SA (K-DUR,KLOR-CON) 20 MEQ tablet Take 1 tablet (20 mEq total) by mouth once daily.    ZOSTAVAX, PF, 19,400 unit/0.65 mL injection     aspirin 81 MG Chew Take 1 tablet (81 mg total) by mouth once daily.           Clinical Reference Information           Your Vitals Were     BP Pulse Height Weight SpO2 BMI    107/64 62 5' (1.524 m) 88.3 kg (194 lb 10.7 oz) 90% 38.02 kg/m2      Blood Pressure          Most Recent Value    BP  107/64      Allergies as of 3/6/2017     Hydrochlorothiazide      Immunizations Administered on Date of Encounter - 3/6/2017     None      Orders Placed During Today's Visit     Future Labs/Procedures Expected by Expires    Methylmalonic acid, serum  3/6/2017 5/5/2018    Urinalysis  3/6/2017 4/5/2017    Vitamin B12  3/6/2017 5/5/2018    Folate  As directed 3/6/2018      Language Assistance Services     ATTENTION: Language assistance services are available, free of charge. Please call [phone].      ATENCIÓN: Si esaula danaaxel, tiene a ramachandran disposición servicios gratuitos de asistencia lingüística. Llame al [phone].     [facility] Ý: N?u b?n nói Ti?ng Vi?t, có các d?ch v? h? tr? ngôn ng? mi?n phí dành cho b?n. G?i s? [phone].         [facility] complies with applicable Federal civil rights laws and does not discriminate on the basis of race, color, national origin, age, disability, or sex.
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
## 2017-03-16 PROBLEM — N25.81 SECONDARY HYPERPARATHYROIDISM: Status: ACTIVE | Noted: 2017-01-01

## 2017-03-16 PROBLEM — N18.30 CKD (CHRONIC KIDNEY DISEASE), STAGE III: Status: ACTIVE | Noted: 2017-01-01

## 2017-03-16 NOTE — PROGRESS NOTES
Patient, Esha Torres (MRN #[id number]), presented with a recorded BMI of 36.98 kg/m^2 and a documented comorbidity(s):  - Hypertension  - Hyperlipidemia  - Atrial Fibrillation  to which the severe obesity is a contributing factor. This is consistent with the definition of severe obesity (BMI 35.0-35.9) with comorbidity (ICD-10 E66.01, Z68.35). The patient's severe obesity was monitored, evaluated, addressed and/or treated. This addendum to the medical record is made on 03/16/2017.

## 2017-03-16 NOTE — MR AVS SNAPSHOT
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    -  Primary     HTN (hypertension), benign         Mobitz type 1 second degree AV block         SSS (sick sinus syndrome)         Hyperlipidemia, mixed         Obesity, Class II, BMI 35-39.9         Gastroesophageal reflux disease without esophagitis         Primary osteoarthritis of both knees         Osteoporosis         Cardiac pacemaker in situ         Diastolic dysfunction         Other pulmonary embolism without acute cor pulmonale, unspecified chronicity         CKD (chronic kidney disease), stage III         Secondary hyperparathyroidism                To Do List           Goals (5 Years of Data)     None      Follow-Up and Disposition     Return in about 3 months (around 2017), or if symptoms worsen or fail to improve.      [facility]     [facility] Care [facility] - 24/7 Assistance  Registered nurses in the [facility] provide clinical advisement, health education, appointment booking, and other advisory services.  Call for this free service at [phone].             Medications           Message regarding Medications     Verify the changes and/or additions to your medication regime listed below are the same as discussed with your clinician today.  If any of these changes or additions are incorrect, please notify your healthcare provider.             Verify that the below list of medications is an accurate representation of the medications you are currently taking.  If none reported, the  list may be blank. If incorrect, please contact your healthcare provider. Carry this list with you in case of emergency.           Current Medications     ERGOCALCIFEROL, VITAMIN D2, (VITAMIN D ORAL) Take by mouth once daily.     FLUZONE HIGH-DOSE 2016-17, PF, 180 mcg/0.5 mL Syrg     furosemide (LASIX) 20 MG tablet Take 1 tablet (20 mg total) by mouth once daily.    lisinopril (PRINIVIL,ZESTRIL) 40 MG tablet Take 1 tablet (40 mg total) by mouth once daily.    potassium chloride SA (K-DUR,KLOR-CON) 20 MEQ tablet Take 1 tablet (20 mEq total) by mouth once daily.    ZOSTAVAX, PF, 19,400 unit/0.65 mL injection     aspirin 81 MG Chew Take 1 tablet (81 mg total) by mouth once daily.           Clinical Reference Information           Your Vitals Were     BP Pulse Height Weight SpO2 BMI    157/78 60 5' (1.524 m) 85.9 kg (189 lb 6 oz) 92% 36.98 kg/m2      Blood Pressure          Most Recent Value    BP  (!)  157/78      Allergies as of 3/16/2017     Hydrochlorothiazide      Immunizations Administered on Date of Encounter - 3/16/2017     None      Orders Placed During Today's Visit     Future Labs/Procedures Expected by Expires    CBC auto differential  3/16/2017 5/15/2018    Comprehensive metabolic panel  3/16/2017 5/15/2018    PTH, intact  3/16/2017 5/15/2018    Vitamin D  3/16/2017 5/15/2018      Language Assistance Services     ATTENTION: Language assistance services are available, free of charge. Please call [phone].      ATENCIÓN: Si habla nancy, tiene a ramachandran disposición servicios gratuitos de asistencia lingüística. Llame al [phone].     [facility] Ý: N?u b?n nói Ti?ng Vi?t, có các d?ch v? h? tr? ngôn ng? mi?n phí dành cho b?n. G?i s? [phone].         [facility] complies with applicable Federal civil rights laws and does not discriminate on the basis of race, color, national origin, age, disability, or sex.
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
## 2017-06-02 NOTE — TELEPHONE ENCOUNTER
----- Message from Janki Rubin sent at 6/2/2017  2:36 PM CDT -----  Contact: Gricelda, daughter, [phone]  Patient has an appointment scheduled on 6/23 but requests for her to be seen sooner or have medication prescribed. Please advise.

## 2017-06-05 NOTE — PROGRESS NOTES
Subjective:       Patient ID: Esha Torres is a 87 y.o. female.    Chief Complaint: Sinusitis    87 yr old white female brought by her daughter with hypertension, hyperlipidemia, osteopenia, OA, obesity, CKD III, h/o PE, presents today for follow up visit and c/o sinus congestion and need med refills. Sinus congestion, cough with green sputum, post nasal drip x 3-4 weeks and gradually worsening. Had fever 101 F once as well. No sick contacts/recent travel/smoking exposure.    Pedal edema - bilateral - onset few months ago - no fever or chills. No SOB or chest pain. Started on diuretic and feels much better.    GERD - much better - on nexium OTC now - no side effects - H Pylori negative      Hypertension - controlled - on lisinopril - compliant - need refill on that - c/o pedal edema R>L      HLD - LDLCALC                  100.2               03/31/2014                      - diet therapy - compliant with diet and does mild exercise - lives alone and no issues      Osteoporosis - on Prolia infusions - doing better      H/O PE - she had PE when she was travelling during [place] and she complete coumadin therapy for 3-6 months and she started doing aspirin until she started having easy bruising and she stopped it and currently doing once every few days - no issues currently -    OA B/L knee - she follows orthopedics at [facility] and receives steroid injection every 3 months - she received steroid injection by me last year and it worked for a year    CKD III and secondary hyperparathyroidism - labs due - Vitamin D also low     History as below - reviewed       Medication Refill   This is a chronic problem. The current episode started more than 1 year ago. The problem occurs constantly. The problem has been gradually improving. Associated symptoms include arthralgias, congestion, coughing, joint swelling, myalgias and a sore throat. Pertinent negatives include no chest pain, diaphoresis, fatigue,  headaches, nausea, neck pain, rash, urinary symptoms, visual change or weakness.   Hypertension   This is a chronic problem. The current episode started more than 1 year ago. The problem has been gradually improving since onset. The problem is controlled. Associated symptoms include peripheral edema. Pertinent negatives include no anxiety, chest pain, headaches, neck pain, shortness of breath or sweats. There are no associated agents to hypertension. Risk factors for coronary artery disease include dyslipidemia, obesity and post-menopausal state. Past treatments include ACE inhibitors. The current treatment provides moderate improvement. There are no compliance problems.  There is no history of angina, CAD/MI, CVA, left ventricular hypertrophy, renovascular disease or retinopathy. There is no history of chronic renal disease, coarctation of the aorta, hypercortisolism, hyperparathyroidism or pheochromocytoma.   Hyperlipidemia   This is a chronic problem. The current episode started more than 1 year ago. The problem is controlled. Recent lipid tests were reviewed and are normal. Exacerbating diseases include obesity. She has no history of chronic renal disease, diabetes, hypothyroidism, liver disease or nephrotic syndrome. There are no known factors aggravating her hyperlipidemia. Associated symptoms include myalgias. Pertinent negatives include no chest pain, focal sensory loss, leg pain or shortness of breath. Current antihyperlipidemic treatment includes diet change. The current treatment provides moderate improvement of lipids. There are no compliance problems.  Risk factors for coronary artery disease include dyslipidemia, hypertension, obesity and post-menopausal.   Arthritis   Presents for follow-up visit. She complains of joint swelling. The symptoms have been worsening. Affected locations include the left knee. Her pain is at a severity of 10/10. Associated symptoms include pain at night and pain while  resting. Pertinent negatives include no diarrhea, dry eyes, dysuria, fatigue, rash or weight loss. Compliance with total regimen is %. Compliance with medications is %.   Sinusitis   This is a new problem. The current episode started more than 1 month ago. The problem is unchanged. There has been no fever. Her pain is at a severity of 5/10. The pain is moderate. Associated symptoms include congestion, coughing, sinus pressure and a sore throat. Pertinent negatives include no diaphoresis, headaches, neck pain or shortness of breath. Past treatments include saline sprays, spray decongestants, lying down and acetaminophen. The treatment provided no relief.     Review of Systems   Constitutional: Negative.  Negative for activity change, diaphoresis, fatigue, unexpected weight change and weight loss.   HENT: Positive for congestion, rhinorrhea, sinus pressure and sore throat. Negative for ear discharge, hearing loss and voice change.    Eyes: Negative.  Negative for pain, discharge and visual disturbance.   Respiratory: Positive for cough. Negative for chest tightness, shortness of breath and wheezing.    Cardiovascular: Negative.  Negative for chest pain.   Gastrointestinal: Negative.  Negative for abdominal distention, anal bleeding, constipation, diarrhea and nausea.   Endocrine: Negative.  Negative for cold intolerance, polydipsia and polyuria.   Genitourinary: Negative.  Negative for decreased urine volume, difficulty urinating, dysuria, frequency, menstrual problem and vaginal pain.   Musculoskeletal: Positive for arthralgias, arthritis, joint swelling and myalgias. Negative for gait problem and neck pain.   Skin: Negative.  Negative for color change, pallor, rash and wound.   Allergic/Immunologic: Negative.  Negative for environmental allergies and immunocompromised state.   Neurological: Negative.  Negative for dizziness, tremors, seizures, speech difficulty, weakness and headaches.   Hematological:  Negative.  Negative for adenopathy. Does not bruise/bleed easily.   Psychiatric/Behavioral: Negative.  Negative for agitation, confusion, decreased concentration, hallucinations, self-injury and suicidal ideas. The patient is not nervous/anxious.        PMH/PSH/FH/SH/MED/ALLERGY reviewed    Objective:       Vitals:    06/05/17 1321   BP: 127/60   Pulse: 69       Physical Exam   Constitutional: She is oriented to person, place, and time. She appears well-developed and well-nourished. No distress.   HENT:   Head: Normocephalic and atraumatic.   Nose: Mucosal edema present. Right sinus exhibits maxillary sinus tenderness. Left sinus exhibits maxillary sinus tenderness.   Eyes: EOM are normal. Pupils are equal, round, and reactive to light.   Neck: Normal range of motion. Neck supple.   Cardiovascular: Normal rate, regular rhythm, normal heart sounds and intact distal pulses.  Exam reveals no gallop and no friction rub.    No murmur heard.  Pulmonary/Chest: Effort normal and breath sounds normal. No respiratory distress. She has no wheezes. She has no rales.   Abdominal: Soft. Bowel sounds are normal. She exhibits no distension and no mass. There is no tenderness. There is no rebound and no guarding. No hernia.   Musculoskeletal: She exhibits edema (2+ Pitting pedeal edema improved). She exhibits no tenderness.   B/L knees edematous and TTP with restricted ROM   Neurological: She is alert and oriented to person, place, and time. She displays normal reflexes. No cranial nerve deficit. She exhibits normal muscle tone. Coordination normal.   Skin: Skin is warm and dry. She is not diaphoretic.   Psychiatric: She has a normal mood and affect.       Assessment:       1. Acute recurrent maxillary sinusitis    2. CKD (chronic kidney disease), stage III    3. Secondary hyperparathyroidism    4. Obesity, Class II, BMI 35-39.9    5. Hyperlipidemia, mixed    6. Gastroesophageal reflux disease without esophagitis    7. Cardiac  pacemaker in situ    8. Diastolic dysfunction    9. HTN (hypertension), benign    10. SSS (sick sinus syndrome)    11. 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 Fatigue, unspecified type    13. Anemia, unspecified type    14. Nutritional anemia         Plan:        Esha was seen today for sinusitis.    Diagnoses and all orders for this visit:    Acute recurrent maxillary sinusitis  -     triamcinolone acetonide injection 40 mg; Inject 1 mL (40 mg total) into the muscle one time.  -     amoxicillin (AMOXIL) 875 MG tablet; Take 1 tablet (875 mg total) by mouth every 12 (twelve) hours.    CKD (chronic kidney disease), stage III  -     CBC auto differential; Future  -     Comprehensive metabolic panel; Future  -     Vitamin D; Future  -     Vitamin B12; Future    Secondary hyperparathyroidism    Obesity, Class II, BMI 35-39.9    Hyperlipidemia, mixed  -     CBC auto differential; Future  -     Comprehensive metabolic panel; Future  -     Lipid panel; Future  -     Vitamin D; Future  -     Vitamin B12; Future    Gastroesophageal reflux disease without esophagitis    Cardiac pacemaker in situ    Diastolic dysfunction  -     furosemide (LASIX) 20 MG tablet; Take 1 tablet (20 mg total) by mouth once daily.    HTN (hypertension), benign  -     potassium chloride SA (K-DUR,KLOR-CON) 20 MEQ tablet; Take 1 tablet (20 mEq total) by mouth once daily.  -     furosemide (LASIX) 20 MG tablet; Take 1 tablet (20 mg total) by mouth once daily.  -     lisinopril (PRINIVIL,ZESTRIL) 40 MG tablet; Take 1 tablet (40 mg total) by mouth once daily.  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    Fatigue, unspecified type  -     TSH;  Future  -     Vitamin D; Future  -     Vitamin B12; Future    Anemia, unspecified type  -     Vitamin B12; Future    Nutritional anemia   -     Vitamin B12; Future      ACUTE SINUSITIS  -Advised saline irrigation, warm humidifier, steam inhalation, vicks vaporub, claritin D for congestion  -AMOXIL X 10 DAYS  -KENALOG 40 MG IM ONCE      B/L legs cellulitis  --improving  -continue lasix with potassium    CKD III  -labs    GERD/PUD  -nexium OTC  -diet and lifestyle modification  -worsening symptoms requires imaging, GI referral for possible EGD    Osteoporosis  -Prolia infusions    HTN  -normal    HLD  - stable on diet control      Osteoarthritis B/L knee  -STOP NSAIDS  -trial of tylenol      Obesity  -diet and exercise  -weight loss    Spent adequate time in obtaining history and explaining differentials    40 minutes spent during this visit of which greater than 50% devoted to face-face counseling and coordination of care regarding diagnosis and management plan     Return in about 3 months (around 9/5/2017), or if symptoms worsen or fail to improve.

## 2017-09-19 NOTE — TELEPHONE ENCOUNTER
Discussed the cybersecurity alert with patient and daughter.  They asked me for my recommendation.  At this time, given the real risk associated with upgrade in software (although small), vs the theoretical risk of a hack, I indicated I would defer upgrade if it were me.  Pt and daughter would prefer to defer at this time as well.

## 2017-11-08 NOTE — PROGRESS NOTES
Subjective:    Patient ID:  Esha Torres is a 88 y.o. female who presents for follow-up of AV Block and Pacemaker Check      HPI  89 yo female with Htn, PE, AV block, PPM.  Presented with symptomatic AV block.  Dual chamber PPM placed (06/08/15).  RV lead dislodgement >> revised (06/11/15).  Device interrogations reveal stable function of leads, 100% RV pacing, no significant arrhythmias.  Bp elevated.  Not checking blood pressures at home.  Admits to anxiety.  Overall feeling well.  Denies syncope, shortness of breath.        Review of Systems   Constitution: Negative. Negative for weakness and malaise/fatigue.   Cardiovascular: Negative for chest pain, dyspnea on exertion, irregular heartbeat, leg swelling, near-syncope, orthopnea, palpitations, paroxysmal nocturnal dyspnea and syncope.   Respiratory: Negative for cough and shortness of breath.    Neurological: Negative for dizziness and light-headedness.   All other systems reviewed and are negative.       Objective:    Physical Exam   Constitutional: She is oriented to person, place, and time. She appears well-developed and well-nourished.   Eyes: Conjunctivae are normal. No scleral icterus.   Neck: No JVD present. No tracheal deviation present.   Cardiovascular: Normal rate and regular rhythm.  PMI is not displaced.    Pulmonary/Chest: Effort normal and breath sounds normal. No respiratory distress.   Abdominal: Soft. There is no hepatosplenomegaly. There is no tenderness.   Musculoskeletal: She exhibits no edema or tenderness.   Neurological: She is alert and oriented to person, place, and time.   Skin: Skin is warm and dry. No rash noted.   Psychiatric: She has a normal mood and affect. Her behavior is normal.         Assessment:       1. Mobitz type 1 second degree AV block    2. HTN (hypertension), benign    3. Cardiac pacemaker in situ    4. 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 Other  pulmonary embolism without acute cor pulmonale, unspecified chronicity    6. Severe obesity (BMI 35.0-35.9 with comorbidity)         Plan:       Continues to do well.  BP elevated today.  Did not sleep last night, and has some anxiety regarding her PPM and the Cyber-security alert (which we have discussed recently).  She is going to monitor her bp at home, and contact her PCP if it is elevated.  Continue current device settings.  F/u with device monitoring as scheduled, with me in one year.

## 2017-12-11 NOTE — PROGRESS NOTES
Subjective:       Patient ID: Esha Torres is a 88 y.o. female.    Chief Complaint: Abdominal Pain and Bowel Incontinence    88 yr old white female brought by her daughter with hypertension, hyperlipidemia, osteopenia, OA, obesity, CKD III, h/o PE, presents today for follow up visit. C/o issues with bowel and indigestion. She sometimes gets diarrhea and sometimes constipation. She normally uses miralax. She also feels like indigestion every time she eats.    Gait instability - chronic - she has frequent falls and sometimes dizzy - she needs walker for assistance    Pedal edema - bilateral - onset few months ago - no fever or chills. No SOB or chest pain. Started on diuretic and feels much better.        Hypertension - controlled - on lisinopril - compliant - need refill on that - c/o pedal edema R>L      HLD - LDLCALC                  109.8               06/09/2017                        - diet therapy - compliant with diet and does mild exercise - lives alone and no issues      Osteoporosis - on Prolia infusions - doing better      H/O PE - she had PE when she was travelling during [place] and she complete coumadin therapy for 3-6 months and she started doing aspirin until she started having easy bruising and she stopped it and currently doing once every few days - no issues currently -    OA B/L knee - she follows orthopedics at [facility] and receives steroid injection every 3 months - she received steroid injection by me last year and it worked for a year    CKD III and secondary hyperparathyroidism - labs due - Vitamin D also low     History as below - reviewed       Medication Refill   This is a chronic problem. The current episode started more than 1 year ago. The problem occurs constantly. The problem has been gradually improving. Associated symptoms include arthralgias, joint swelling and myalgias. Pertinent negatives include no chest pain, congestion, diaphoresis, fatigue, headaches,  nausea, neck pain, rash, sore throat, urinary symptoms, visual change or weakness.   Hypertension   This is a chronic problem. The current episode started more than 1 year ago. The problem has been gradually improving since onset. The problem is controlled. Associated symptoms include peripheral edema. Pertinent negatives include no anxiety, chest pain, headaches, neck pain, shortness of breath or sweats. There are no associated agents to hypertension. Risk factors for coronary artery disease include dyslipidemia, obesity and post-menopausal state. Past treatments include ACE inhibitors. The current treatment provides moderate improvement. There are no compliance problems.  There is no history of angina, CAD/MI, CVA, left ventricular hypertrophy, renovascular disease or retinopathy. There is no history of chronic renal disease, coarctation of the aorta, hypercortisolism, hyperparathyroidism or pheochromocytoma.   Hyperlipidemia   This is a chronic problem. The current episode started more than 1 year ago. The problem is controlled. Recent lipid tests were reviewed and are normal. Exacerbating diseases include obesity. She has no history of chronic renal disease, diabetes, hypothyroidism, liver disease or nephrotic syndrome. There are no known factors aggravating her hyperlipidemia. Associated symptoms include myalgias. Pertinent negatives include no chest pain, focal sensory loss, leg pain or shortness of breath. Current antihyperlipidemic treatment includes diet change. The current treatment provides moderate improvement of lipids. There are no compliance problems.  Risk factors for coronary artery disease include dyslipidemia, hypertension, obesity and post-menopausal.   Arthritis   Presents for follow-up visit. She complains of joint swelling. The symptoms have been worsening. Affected locations include the left knee. Her pain is at a severity of 10/10. Associated symptoms include diarrhea, pain at night and pain  while resting. Pertinent negatives include no dry eyes, dysuria, fatigue, rash or weight loss. Compliance with total regimen is %. Compliance with medications is %.     Review of Systems   Constitutional: Negative.  Negative for activity change, diaphoresis, fatigue, unexpected weight change and weight loss.   HENT: Negative.  Negative for congestion, ear discharge, hearing loss, rhinorrhea, sore throat and voice change.    Eyes: Negative.  Negative for pain, discharge and visual disturbance.   Respiratory: Negative.  Negative for chest tightness, shortness of breath and wheezing.    Cardiovascular: Negative.  Negative for chest pain.   Gastrointestinal: Positive for abdominal distention, constipation and diarrhea. Negative for anal bleeding and nausea.   Endocrine: Negative.  Negative for cold intolerance, polydipsia and polyuria.   Genitourinary: Negative.  Negative for decreased urine volume, difficulty urinating, dysuria, frequency, menstrual problem and vaginal pain.   Musculoskeletal: Positive for arthralgias, arthritis, joint swelling and myalgias. Negative for gait problem and neck pain.   Skin: Negative.  Negative for color change, pallor, rash and wound.   Allergic/Immunologic: Negative.  Negative for environmental allergies and immunocompromised state.   Neurological: Negative.  Negative for dizziness, tremors, seizures, speech difficulty, weakness and headaches.   Hematological: Negative.  Negative for adenopathy. Does not bruise/bleed easily.   Psychiatric/Behavioral: Negative.  Negative for agitation, confusion, decreased concentration, hallucinations, self-injury and suicidal ideas. The patient is not nervous/anxious.        PMH/PSH/FH/SH/MED/ALLERGY reviewed    Objective:       Vitals:    12/11/17 1320   BP: 121/64   Pulse: 82       Physical Exam   Constitutional: She is oriented to person, place, and time. She appears well-developed and well-nourished. No distress.   HENT:   Head:  Normocephalic and atraumatic.   Eyes: EOM are normal. Pupils are equal, round, and reactive to light.   Neck: Normal range of motion. Neck supple.   Cardiovascular: Normal rate, regular rhythm, normal heart sounds and intact distal pulses.  Exam reveals no gallop and no friction rub.    No murmur heard.  Pulmonary/Chest: Effort normal and breath sounds normal. No respiratory distress. She has no wheezes. She has no rales.   Abdominal: Soft. Bowel sounds are normal. She exhibits no distension and no mass. There is no tenderness. There is no rebound and no guarding. No hernia.   Musculoskeletal: She exhibits edema (2+ Pitting pedeal edema improved). She exhibits no tenderness.   B/L knees edematous and TTP with restricted ROM   Neurological: She is alert and oriented to person, place, and time. She displays normal reflexes. No cranial nerve deficit. She exhibits normal muscle tone. Coordination normal.   Skin: Skin is warm and dry. She is not diaphoretic.   Psychiatric: She has a normal mood and affect.       Assessment:       1. Indigestion    2. SSS (sick sinus syndrome)    3. CKD (chronic kidney disease), stage III    4. Severe obesity (BMI 35.0-35.9 with comorbidity)    5. Gastroesophageal reflux disease without esophagitis    6. Osteoporosis, unspecified osteoporosis type, unspecified pathological fracture presence    7. Cardiac pacemaker in situ    8. HTN (hypertension), benign    9. Hyperlipidemia, mixed    10. Gait instability        Plan:       Esha was seen today for abdominal pain and bowel incontinence.    Diagnoses and all orders for this visit:    Indigestion    SSS (sick sinus syndrome)  -     WALKER FOR HOME USE    CKD (chronic kidney disease), stage III  -     WALKER FOR HOME USE    Severe obesity (BMI 35.0-35.9 with comorbidity)  -     WALKER FOR HOME USE    Gastroesophageal reflux disease without esophagitis  -     WALKER FOR HOME USE  -     Discontinue: omeprazole (PRILOSEC) 20 MG capsule; Take 1  capsule (20 mg total) by mouth once daily.  -     omeprazole (PRILOSEC) 20 MG capsule; Take 1 capsule (20 mg total) by mouth once daily.    Osteoporosis, unspecified osteoporosis type, unspecified pathological fracture presence  -     WALKER FOR HOME USE    Cardiac pacemaker in situ  -     WALKER FOR HOME USE    HTN (hypertension), benign  -     WALKER FOR HOME USE    Hyperlipidemia, mixed    Gait instability  -     WALKER FOR HOME USE      Indigestion/GERD  -diet and lifestyle changes  -trial of Prilosec daily x 14 days and reevaluate  -try probiotic daily  -may need GI referral for persisting symptoms  -worsening symptoms requires imaging, GI referral for possible EGD      B/L legs cellulitis  --improving  -continue lasix with potassium    CKD III  -labs    GERD/PUD  -nexium OTC  -diet and lifestyle modification      Osteoporosis  -Prolia infusions - but she wants to wait    HTN  -normal    HLD  - stable on diet control      Osteoarthritis B/L knee  -STOP NSAIDS  -trial of tylenol      Obesity  -diet and exercise  -weight loss    Spent adequate time in obtaining history and explaining differentials    40 minutes spent during this visit of which greater than 50% devoted to face-face counseling and coordination of care regarding diagnosis and management plan     Return in about 4 weeks (around 1/8/2018), or if symptoms worsen or fail to improve.

## 2018-01-01 ENCOUNTER — TELEPHONE (OUTPATIENT)
Dept: FAMILY MEDICINE | Facility: CLINIC | Age: 83
End: 2018-01-01

## 2018-01-01 ENCOUNTER — PES CALL (OUTPATIENT)
Dept: ADMINISTRATIVE | Facility: CLINIC | Age: 83
End: 2018-01-01

## 2018-01-01 ENCOUNTER — ANESTHESIA EVENT (OUTPATIENT)
Dept: INTENSIVE CARE | Facility: HOSPITAL | Age: 83
DRG: 480 | End: 2018-01-01
Payer: MEDICARE

## 2018-01-01 ENCOUNTER — LAB VISIT (OUTPATIENT)
Dept: LAB | Facility: HOSPITAL | Age: 83
End: 2018-01-01
Attending: FAMILY MEDICINE
Payer: MEDICARE

## 2018-01-01 ENCOUNTER — PATIENT MESSAGE (OUTPATIENT)
Dept: FAMILY MEDICINE | Facility: CLINIC | Age: 83
End: 2018-01-01

## 2018-01-01 ENCOUNTER — OFFICE VISIT (OUTPATIENT)
Dept: FAMILY MEDICINE | Facility: CLINIC | Age: 83
End: 2018-01-01
Attending: FAMILY MEDICINE
Payer: MEDICARE

## 2018-01-01 ENCOUNTER — ANESTHESIA EVENT (OUTPATIENT)
Dept: SURGERY | Facility: HOSPITAL | Age: 83
End: 2018-01-01

## 2018-01-01 ENCOUNTER — ANESTHESIA (OUTPATIENT)
Dept: INTENSIVE CARE | Facility: HOSPITAL | Age: 83
DRG: 480 | End: 2018-01-01
Payer: MEDICARE

## 2018-01-01 ENCOUNTER — ANESTHESIA (OUTPATIENT)
Dept: SURGERY | Facility: HOSPITAL | Age: 83
DRG: 480 | End: 2018-01-01
Payer: MEDICARE

## 2018-01-01 ENCOUNTER — ANESTHESIA EVENT (OUTPATIENT)
Dept: SURGERY | Facility: HOSPITAL | Age: 83
DRG: 480 | End: 2018-01-01
Payer: MEDICARE

## 2018-01-01 ENCOUNTER — HOSPITAL ENCOUNTER (INPATIENT)
Facility: HOSPITAL | Age: 83
LOS: 7 days | DRG: 480 | End: 2018-02-02
Attending: EMERGENCY MEDICINE | Admitting: HOSPITALIST
Payer: MEDICARE

## 2018-01-01 ENCOUNTER — HOSPITAL ENCOUNTER (EMERGENCY)
Facility: HOSPITAL | Age: 83
Discharge: HOME | End: 2018-01-04
Attending: EMERGENCY MEDICINE
Payer: MEDICARE

## 2018-01-01 ENCOUNTER — TELEPHONE (OUTPATIENT)
Dept: ORTHOPEDICS | Facility: CLINIC | Age: 83
End: 2018-01-01

## 2018-01-01 VITALS
TEMPERATURE: 92 F | SYSTOLIC BLOOD PRESSURE: 89 MMHG | OXYGEN SATURATION: 91 % | DIASTOLIC BLOOD PRESSURE: 61 MMHG | RESPIRATION RATE: 32 BRPM | BODY MASS INDEX: 38.87 KG/M2 | HEIGHT: 60 IN | HEART RATE: 71 BPM | WEIGHT: 198 LBS

## 2018-01-01 VITALS
BODY MASS INDEX: 30.86 KG/M2 | TEMPERATURE: 98 F | SYSTOLIC BLOOD PRESSURE: 139 MMHG | OXYGEN SATURATION: 97 % | HEART RATE: 76 BPM | DIASTOLIC BLOOD PRESSURE: 84 MMHG | HEIGHT: 66 IN | WEIGHT: 192 LBS

## 2018-01-01 VITALS
OXYGEN SATURATION: 100 % | HEIGHT: 66 IN | RESPIRATION RATE: 20 BRPM | BODY MASS INDEX: 28.12 KG/M2 | HEART RATE: 60 BPM | WEIGHT: 175 LBS | DIASTOLIC BLOOD PRESSURE: 71 MMHG | TEMPERATURE: 98 F | SYSTOLIC BLOOD PRESSURE: 164 MMHG

## 2018-01-01 DIAGNOSIS — M81.0 OSTEOPOROSIS, UNSPECIFIED OSTEOPOROSIS TYPE, UNSPECIFIED PATHOLOGICAL FRACTURE PRESENCE: ICD-10-CM

## 2018-01-01 DIAGNOSIS — E78.2 HYPERLIPIDEMIA, MIXED: ICD-10-CM

## 2018-01-01 DIAGNOSIS — N18.30 BENIGN HYPERTENSIVE HEART AND KIDNEY DISEASE WITH DIASTOLIC CHF, NYHA CLASS II AND CKD STAGE III: ICD-10-CM

## 2018-01-01 DIAGNOSIS — R19.5 LOOSE STOOLS: Primary | ICD-10-CM

## 2018-01-01 DIAGNOSIS — I10 HTN (HYPERTENSION), BENIGN: ICD-10-CM

## 2018-01-01 DIAGNOSIS — Z95.0 CARDIAC PACEMAKER IN SITU: ICD-10-CM

## 2018-01-01 DIAGNOSIS — I50.9 CHF (CONGESTIVE HEART FAILURE): ICD-10-CM

## 2018-01-01 DIAGNOSIS — K21.9 GASTROESOPHAGEAL REFLUX DISEASE WITHOUT ESOPHAGITIS: ICD-10-CM

## 2018-01-01 DIAGNOSIS — I49.5 SSS (SICK SINUS SYNDROME): ICD-10-CM

## 2018-01-01 DIAGNOSIS — K55.059 ACUTE MESENTERIC ISCHEMIA: ICD-10-CM

## 2018-01-01 DIAGNOSIS — J96.00 ACUTE RESPIRATORY FAILURE, UNSPECIFIED WHETHER WITH HYPOXIA OR HYPERCAPNIA: ICD-10-CM
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
## 2018-01-01 DIAGNOSIS — S72.141A INTERTROCHANTERIC FRACTURE OF RIGHT FEMUR: ICD-10-CM

## 2018-01-01 DIAGNOSIS — E87.5 HYPERKALEMIA: Primary | ICD-10-CM

## 2018-01-01 DIAGNOSIS — S72.141A CLOSED DISPLACED INTERTROCHANTERIC FRACTURE OF RIGHT FEMUR, INITIAL ENCOUNTER: ICD-10-CM

## 2018-01-01 DIAGNOSIS — N18.30 CKD (CHRONIC KIDNEY DISEASE), STAGE III: ICD-10-CM

## 2018-01-01 DIAGNOSIS — R07.9 CHEST PAIN: ICD-10-CM

## 2018-01-01 DIAGNOSIS — Z01.818 PRE-OP TESTING: ICD-10-CM

## 2018-01-01 DIAGNOSIS — I13.0 BENIGN HYPERTENSIVE HEART AND KIDNEY DISEASE WITH DIASTOLIC CHF, NYHA CLASS II AND CKD STAGE III: ICD-10-CM

## 2018-01-01 DIAGNOSIS — I50.30 BENIGN HYPERTENSIVE HEART AND KIDNEY DISEASE WITH DIASTOLIC CHF, NYHA CLASS II AND CKD STAGE III: ICD-10-CM

## 2018-01-01 DIAGNOSIS — R19.7 DIARRHEA, UNSPECIFIED TYPE: Primary | ICD-10-CM

## 2018-01-01 DIAGNOSIS — N18.30 CKD (CHRONIC KIDNEY DISEASE), STAGE III: Chronic | ICD-10-CM

## 2018-01-01 DIAGNOSIS — I26.99 OTHER PULMONARY EMBOLISM WITHOUT ACUTE COR PULMONALE, UNSPECIFIED CHRONICITY: ICD-10-CM

## 2018-01-01 DIAGNOSIS — I10 BENIGN ESSENTIAL HYPERTENSION: Chronic | ICD-10-CM

## 2018-01-01 DIAGNOSIS — I51.89 DIASTOLIC DYSFUNCTION: ICD-10-CM

## 2018-01-01 DIAGNOSIS — N17.9 AKI (ACUTE KIDNEY INJURY): ICD-10-CM

## 2018-01-01 DIAGNOSIS — N25.81 SECONDARY HYPERPARATHYROIDISM: ICD-10-CM

## 2018-01-01 DIAGNOSIS — I10 HTN (HYPERTENSION), BENIGN: Primary | ICD-10-CM

## 2018-01-01 DIAGNOSIS — E87.20 LACTIC ACIDOSIS: ICD-10-CM

## 2018-01-01 DIAGNOSIS — S72.001A CLOSED FRACTURE OF RIGHT HIP, INITIAL ENCOUNTER: ICD-10-CM

## 2018-01-01 DIAGNOSIS — R19.7 DIARRHEA, UNSPECIFIED TYPE: ICD-10-CM

## 2018-01-01 LAB
ABO + RH BLD: NORMAL
ABO + RH BLD: NORMAL
ALBUMIN SERPL BCP-MCNC: 1.8 G/DL
ALBUMIN SERPL BCP-MCNC: 2 G/DL
ALBUMIN SERPL BCP-MCNC: 2.3 G/DL
ALBUMIN SERPL BCP-MCNC: 2.9 G/DL
ALBUMIN SERPL BCP-MCNC: 3.3 G/DL
ALBUMIN SERPL BCP-MCNC: 3.6 G/DL
ALLENS TEST: ABNORMAL
ALP SERPL-CCNC: 133 U/L
ALP SERPL-CCNC: 136 U/L
ALP SERPL-CCNC: 67 U/L
ALP SERPL-CCNC: 68 U/L
ALP SERPL-CCNC: 88 U/L
ALT SERPL W/O P-5'-P-CCNC: 10 U/L
ALT SERPL W/O P-5'-P-CCNC: 11 U/L
ALT SERPL W/O P-5'-P-CCNC: 8 U/L
ALT SERPL W/O P-5'-P-CCNC: 86 U/L
ALT SERPL W/O P-5'-P-CCNC: 9 U/L
ANION GAP SERPL CALC-SCNC: 10 MMOL/L
ANION GAP SERPL CALC-SCNC: 12 MMOL/L
ANION GAP SERPL CALC-SCNC: 17 MMOL/L
ANION GAP SERPL CALC-SCNC: 18 MMOL/L
ANION GAP SERPL CALC-SCNC: 18 MMOL/L
ANION GAP SERPL CALC-SCNC: 19 MMOL/L
ANION GAP SERPL CALC-SCNC: 20 MMOL/L
ANION GAP SERPL CALC-SCNC: 23 MMOL/L
ANION GAP SERPL CALC-SCNC: 23 MMOL/L
ANION GAP SERPL CALC-SCNC: 24 MMOL/L
ANION GAP SERPL CALC-SCNC: 24 MMOL/L
ANION GAP SERPL CALC-SCNC: 25 MMOL/L
ANION GAP SERPL CALC-SCNC: 26 MMOL/L
ANION GAP SERPL CALC-SCNC: 7 MMOL/L
ANION GAP SERPL CALC-SCNC: 8 MMOL/L
ANION GAP SERPL CALC-SCNC: 9 MMOL/L
ANISOCYTOSIS BLD QL SMEAR: SLIGHT
APTT BLDCRRT: 27.1 SEC
APTT BLDCRRT: 48.2 SEC
APTT BLDCRRT: 61.6 SEC
APTT BLDCRRT: 61.6 SEC
AST SERPL-CCNC: 12 U/L
AST SERPL-CCNC: 12 U/L
AST SERPL-CCNC: 15 U/L
AST SERPL-CCNC: 280 U/L
AST SERPL-CCNC: 29 U/L
BACTERIA #/AREA URNS HPF: ABNORMAL /HPF
BASOPHILS # BLD AUTO: 0 K/UL
BASOPHILS # BLD AUTO: 0.01 K/UL
BASOPHILS # BLD AUTO: 0.01 K/UL
BASOPHILS # BLD AUTO: 0.02 K/UL
BASOPHILS # BLD AUTO: 0.03 K/UL
BASOPHILS # BLD AUTO: 0.08 K/UL
BASOPHILS # BLD AUTO: ABNORMAL K/UL
BASOPHILS # BLD AUTO: ABNORMAL K/UL
BASOPHILS NFR BLD: 0 %
BASOPHILS NFR BLD: 0.1 %
BASOPHILS NFR BLD: 0.1 %
BASOPHILS NFR BLD: 0.2 %
BASOPHILS NFR BLD: 0.2 %
BASOPHILS NFR BLD: 0.4 %
BILIRUB DIRECT SERPL-MCNC: 0.5 MG/DL
BILIRUB SERPL-MCNC: 0.2 MG/DL
BILIRUB SERPL-MCNC: 0.9 MG/DL
BILIRUB SERPL-MCNC: 1.3 MG/DL
BILIRUB UR QL STRIP: ABNORMAL
BILIRUB UR QL STRIP: ABNORMAL
BILIRUB UR QL STRIP: NEGATIVE
BLD GP AB SCN CELLS X3 SERPL QL: NORMAL
BLD GP AB SCN CELLS X3 SERPL QL: NORMAL
BLD PROD TYP BPU: NORMAL
BLOOD UNIT EXPIRATION DATE: NORMAL
BLOOD UNIT TYPE CODE: 5100
BLOOD UNIT TYPE: NORMAL
BNP SERPL-MCNC: 156 PG/ML
BUN SERPL-MCNC: 23 MG/DL
BUN SERPL-MCNC: 25 MG/DL
BUN SERPL-MCNC: 25 MG/DL
BUN SERPL-MCNC: 31 MG/DL
BUN SERPL-MCNC: 33 MG/DL
BUN SERPL-MCNC: 37 MG/DL
BUN SERPL-MCNC: 37 MG/DL
BUN SERPL-MCNC: 38 MG/DL
BUN SERPL-MCNC: 39 MG/DL
BUN SERPL-MCNC: 40 MG/DL
BUN SERPL-MCNC: 40 MG/DL
BUN SERPL-MCNC: 42 MG/DL
BUN SERPL-MCNC: 43 MG/DL
CALCIUM SERPL-MCNC: 10.2 MG/DL
CALCIUM SERPL-MCNC: 6.9 MG/DL
CALCIUM SERPL-MCNC: 7 MG/DL
CALCIUM SERPL-MCNC: 7.1 MG/DL
CALCIUM SERPL-MCNC: 7.2 MG/DL
CALCIUM SERPL-MCNC: 7.3 MG/DL
CALCIUM SERPL-MCNC: 7.6 MG/DL
CALCIUM SERPL-MCNC: 8.6 MG/DL
CALCIUM SERPL-MCNC: 8.7 MG/DL
CALCIUM SERPL-MCNC: 8.9 MG/DL
CALCIUM SERPL-MCNC: 9.1 MG/DL
CALCIUM SERPL-MCNC: 9.6 MG/DL
CALCIUM SERPL-MCNC: 9.7 MG/DL
CALCIUM SERPL-MCNC: 9.8 MG/DL
CHLORIDE SERPL-SCNC: 100 MMOL/L
CHLORIDE SERPL-SCNC: 101 MMOL/L
CHLORIDE SERPL-SCNC: 102 MMOL/L
CHLORIDE SERPL-SCNC: 103 MMOL/L
CHLORIDE SERPL-SCNC: 104 MMOL/L
CHLORIDE SERPL-SCNC: 105 MMOL/L
CHLORIDE SERPL-SCNC: 106 MMOL/L
CHLORIDE SERPL-SCNC: 107 MMOL/L
CHLORIDE SERPL-SCNC: 107 MMOL/L
CHLORIDE SERPL-SCNC: 108 MMOL/L
CHLORIDE SERPL-SCNC: 108 MMOL/L
CHLORIDE SERPL-SCNC: 95 MMOL/L
CHLORIDE SERPL-SCNC: 97 MMOL/L
CHLORIDE SERPL-SCNC: 98 MMOL/L
CK SERPL-CCNC: 43 U/L
CLARITY UR REFRACT.AUTO: CLEAR
CLARITY UR: CLEAR
CLARITY UR: CLEAR
CO2 SERPL-SCNC: 10 MMOL/L
CO2 SERPL-SCNC: 11 MMOL/L
CO2 SERPL-SCNC: 12 MMOL/L
CO2 SERPL-SCNC: 13 MMOL/L
CO2 SERPL-SCNC: 19 MMOL/L
CO2 SERPL-SCNC: 20 MMOL/L
CO2 SERPL-SCNC: 21 MMOL/L
CO2 SERPL-SCNC: 23 MMOL/L
CO2 SERPL-SCNC: 24 MMOL/L
CO2 SERPL-SCNC: 26 MMOL/L
CO2 SERPL-SCNC: 5 MMOL/L
CO2 SERPL-SCNC: 6 MMOL/L
CO2 SERPL-SCNC: 6 MMOL/L
CO2 SERPL-SCNC: 8 MMOL/L
CO2 SERPL-SCNC: 9 MMOL/L
CO2 SERPL-SCNC: 9 MMOL/L
CODING SYSTEM: NORMAL
COLOR UR AUTO: YELLOW
COLOR UR: YELLOW
COLOR UR: YELLOW
CREAT SERPL-MCNC: 1.1 MG/DL
CREAT SERPL-MCNC: 1.2 MG/DL
CREAT SERPL-MCNC: 1.3 MG/DL
CREAT SERPL-MCNC: 1.5 MG/DL
CREAT SERPL-MCNC: 1.6 MG/DL
CREAT SERPL-MCNC: 1.6 MG/DL
CREAT SERPL-MCNC: 1.7 MG/DL
CREAT SERPL-MCNC: 1.8 MG/DL
CREAT SERPL-MCNC: 1.9 MG/DL
DELSYS: ABNORMAL
DIASTOLIC DYSFUNCTION: NO
DIFFERENTIAL METHOD: ABNORMAL
DISPENSE STATUS: NORMAL
EOSINOPHIL # BLD AUTO: 0 K/UL
EOSINOPHIL # BLD AUTO: 0.1 K/UL
EOSINOPHIL # BLD AUTO: ABNORMAL K/UL
EOSINOPHIL # BLD AUTO: ABNORMAL K/UL
EOSINOPHIL NFR BLD: 0 %
EOSINOPHIL NFR BLD: 0.1 %
EOSINOPHIL NFR BLD: 0.1 %
EOSINOPHIL NFR BLD: 0.2 %
EOSINOPHIL NFR BLD: 0.2 %
EOSINOPHIL NFR BLD: 0.3 %
ERYTHROCYTE [DISTWIDTH] IN BLOOD BY AUTOMATED COUNT: 12.5 %
ERYTHROCYTE [DISTWIDTH] IN BLOOD BY AUTOMATED COUNT: 12.8 %
ERYTHROCYTE [DISTWIDTH] IN BLOOD BY AUTOMATED COUNT: 12.9 %
ERYTHROCYTE [DISTWIDTH] IN BLOOD BY AUTOMATED COUNT: 13 %
ERYTHROCYTE [DISTWIDTH] IN BLOOD BY AUTOMATED COUNT: 13.1 %
ERYTHROCYTE [DISTWIDTH] IN BLOOD BY AUTOMATED COUNT: 14.3 %
ERYTHROCYTE [DISTWIDTH] IN BLOOD BY AUTOMATED COUNT: 14.5 %
ERYTHROCYTE [DISTWIDTH] IN BLOOD BY AUTOMATED COUNT: 14.7 %
ERYTHROCYTE [DISTWIDTH] IN BLOOD BY AUTOMATED COUNT: 14.7 %
ERYTHROCYTE [DISTWIDTH] IN BLOOD BY AUTOMATED COUNT: 14.8 %
ERYTHROCYTE [DISTWIDTH] IN BLOOD BY AUTOMATED COUNT: 14.9 %
ERYTHROCYTE [DISTWIDTH] IN BLOOD BY AUTOMATED COUNT: 15.2 %
ERYTHROCYTE [SEDIMENTATION RATE] IN BLOOD BY WESTERGREN METHOD: 24 MM/H
ERYTHROCYTE [SEDIMENTATION RATE] IN BLOOD BY WESTERGREN METHOD: 24 MM/H
ERYTHROCYTE [SEDIMENTATION RATE] IN BLOOD BY WESTERGREN METHOD: 32 MM/H
EST. GFR  (AFRICAN AMERICAN): 27 ML/MIN/1.73 M^2
EST. GFR  (AFRICAN AMERICAN): 29 ML/MIN/1.73 M^2
EST. GFR  (AFRICAN AMERICAN): 31 ML/MIN/1.73 M^2
EST. GFR  (AFRICAN AMERICAN): 33 ML/MIN/1.73 M^2
EST. GFR  (AFRICAN AMERICAN): 33 ML/MIN/1.73 M^2
EST. GFR  (AFRICAN AMERICAN): 36 ML/MIN/1.73 M^2
EST. GFR  (AFRICAN AMERICAN): 42 ML/MIN/1.73 M^2
EST. GFR  (AFRICAN AMERICAN): 42 ML/MIN/1.73 M^2
EST. GFR  (AFRICAN AMERICAN): 42.3 ML/MIN/1.73 M^2
EST. GFR  (AFRICAN AMERICAN): 47 ML/MIN/1.73 M^2
EST. GFR  (AFRICAN AMERICAN): 52 ML/MIN/1.73 M^2
EST. GFR  (NON AFRICAN AMERICAN): 23 ML/MIN/1.73 M^2
EST. GFR  (NON AFRICAN AMERICAN): 25 ML/MIN/1.73 M^2
EST. GFR  (NON AFRICAN AMERICAN): 27 ML/MIN/1.73 M^2
EST. GFR  (NON AFRICAN AMERICAN): 29 ML/MIN/1.73 M^2
EST. GFR  (NON AFRICAN AMERICAN): 29 ML/MIN/1.73 M^2
EST. GFR  (NON AFRICAN AMERICAN): 31 ML/MIN/1.73 M^2
EST. GFR  (NON AFRICAN AMERICAN): 36.7 ML/MIN/1.73 M^2
EST. GFR  (NON AFRICAN AMERICAN): 37 ML/MIN/1.73 M^2
EST. GFR  (NON AFRICAN AMERICAN): 37 ML/MIN/1.73 M^2
EST. GFR  (NON AFRICAN AMERICAN): 40 ML/MIN/1.73 M^2
EST. GFR  (NON AFRICAN AMERICAN): 45 ML/MIN/1.73 M^2
FACT X PPP CHRO-ACNC: <0.1 IU/ML
FIO2: 100
FIO2: 40
FIO2: 50
GLUCOSE SERPL-MCNC: 104 MG/DL
GLUCOSE SERPL-MCNC: 118 MG/DL
GLUCOSE SERPL-MCNC: 119 MG/DL
GLUCOSE SERPL-MCNC: 132 MG/DL
GLUCOSE SERPL-MCNC: 155 MG/DL
GLUCOSE SERPL-MCNC: 159 MG/DL
GLUCOSE SERPL-MCNC: 166 MG/DL
GLUCOSE SERPL-MCNC: 173 MG/DL
GLUCOSE SERPL-MCNC: 188 MG/DL
GLUCOSE SERPL-MCNC: 192 MG/DL
GLUCOSE SERPL-MCNC: 200 MG/DL
GLUCOSE SERPL-MCNC: 238 MG/DL
GLUCOSE SERPL-MCNC: 43 MG/DL
GLUCOSE SERPL-MCNC: 46 MG/DL
GLUCOSE SERPL-MCNC: 77 MG/DL
GLUCOSE SERPL-MCNC: 79 MG/DL
GLUCOSE UR QL STRIP: NEGATIVE
HCO3 UR-SCNC: 11.7 MMOL/L (ref 24–28)
HCO3 UR-SCNC: 6.2 MMOL/L (ref 24–28)
HCO3 UR-SCNC: 6.5 MMOL/L (ref 24–28)
HCO3 UR-SCNC: 8.8 MMOL/L (ref 24–28)
HCT VFR BLD AUTO: 18.2 %
HCT VFR BLD AUTO: 21.3 %
HCT VFR BLD AUTO: 22.2 %
HCT VFR BLD AUTO: 23 %
HCT VFR BLD AUTO: 24.1 %
HCT VFR BLD AUTO: 24.1 %
HCT VFR BLD AUTO: 27.3 %
HCT VFR BLD AUTO: 28.3 %
HCT VFR BLD AUTO: 32.2 %
HCT VFR BLD AUTO: 32.8 %
HCT VFR BLD AUTO: 33.3 %
HCT VFR BLD AUTO: 33.7 %
HCT VFR BLD AUTO: 35.2 %
HCT VFR BLD AUTO: 41.5 %
HCT VFR BLD CALC: 25 %PCV (ref 36–54)
HCT VFR BLD CALC: 29 %PCV (ref 36–54)
HGB BLD-MCNC: 10 G/DL
HGB BLD-MCNC: 10.3 G/DL
HGB BLD-MCNC: 10.5 G/DL
HGB BLD-MCNC: 10.5 G/DL
HGB BLD-MCNC: 10.7 G/DL
HGB BLD-MCNC: 11.4 G/DL
HGB BLD-MCNC: 13.1 G/DL
HGB BLD-MCNC: 5.5 G/DL
HGB BLD-MCNC: 6.9 G/DL
HGB BLD-MCNC: 7.1 G/DL
HGB BLD-MCNC: 7.5 G/DL
HGB BLD-MCNC: 7.5 G/DL
HGB BLD-MCNC: 7.7 G/DL
HGB BLD-MCNC: 8.4 G/DL
HGB BLD-MCNC: 8.8 G/DL
HGB BLD-MCNC: 9 G/DL
HGB UR QL STRIP: ABNORMAL
HGB UR QL STRIP: NEGATIVE
HGB UR QL STRIP: NEGATIVE
HYALINE CASTS #/AREA URNS LPF: 1 /LPF
HYPOCHROMIA BLD QL SMEAR: ABNORMAL
IMM GRANULOCYTES # BLD AUTO: 0.02 K/UL
IMM GRANULOCYTES NFR BLD AUTO: 0.2 %
INR PPP: 0.9
INR PPP: 1.2
INR PPP: 3.1
INR PPP: 3.1
KETONES UR QL STRIP: ABNORMAL
KETONES UR QL STRIP: NEGATIVE
KETONES UR QL STRIP: NEGATIVE
LACTATE SERPL-SCNC: 11 MMOL/L
LACTATE SERPL-SCNC: 8 MMOL/L
LACTATE SERPL-SCNC: 8.7 MMOL/L
LACTATE SERPL-SCNC: 9.6 MMOL/L
LACTATE SERPL-SCNC: >12 MMOL/L
LEUKOCYTE ESTERASE UR QL STRIP: NEGATIVE
LIPASE SERPL-CCNC: 12 U/L
LIPASE SERPL-CCNC: 42 U/L
LYMPHOCYTES # BLD AUTO: 0.7 K/UL
LYMPHOCYTES # BLD AUTO: 0.9 K/UL
LYMPHOCYTES # BLD AUTO: 1 K/UL
LYMPHOCYTES # BLD AUTO: 1.2 K/UL
LYMPHOCYTES # BLD AUTO: 1.3 K/UL
LYMPHOCYTES # BLD AUTO: 1.6 K/UL
LYMPHOCYTES # BLD AUTO: ABNORMAL K/UL
LYMPHOCYTES # BLD AUTO: ABNORMAL K/UL
LYMPHOCYTES NFR BLD: 11 %
LYMPHOCYTES NFR BLD: 13.3 %
LYMPHOCYTES NFR BLD: 14 %
LYMPHOCYTES NFR BLD: 16.2 %
LYMPHOCYTES NFR BLD: 3.6 %
LYMPHOCYTES NFR BLD: 6.5 %
LYMPHOCYTES NFR BLD: 8.4 %
LYMPHOCYTES NFR BLD: 9.4 %
MAGNESIUM SERPL-MCNC: 1.9 MG/DL
MAGNESIUM SERPL-MCNC: 2.3 MG/DL
MAGNESIUM SERPL-MCNC: 2.7 MG/DL
MAGNESIUM SERPL-MCNC: 2.8 MG/DL
MAGNESIUM SERPL-MCNC: 2.8 MG/DL
MCH RBC QN AUTO: 30.3 PG
MCH RBC QN AUTO: 30.4 PG
MCH RBC QN AUTO: 30.6 PG
MCH RBC QN AUTO: 30.7 PG
MCH RBC QN AUTO: 30.7 PG
MCH RBC QN AUTO: 31.3 PG
MCH RBC QN AUTO: 31.3 PG
MCH RBC QN AUTO: 31.5 PG
MCH RBC QN AUTO: 31.7 PG
MCH RBC QN AUTO: 31.7 PG
MCHC RBC AUTO-ENTMCNC: 30.2 G/DL
MCHC RBC AUTO-ENTMCNC: 30.9 G/DL
MCHC RBC AUTO-ENTMCNC: 31.1 G/DL
MCHC RBC AUTO-ENTMCNC: 31.6 G/DL
MCHC RBC AUTO-ENTMCNC: 31.8 G/DL
MCHC RBC AUTO-ENTMCNC: 32 G/DL
MCHC RBC AUTO-ENTMCNC: 32.4 G/DL
MCHC RBC AUTO-ENTMCNC: 32.4 G/DL
MCHC RBC AUTO-ENTMCNC: 32.6 G/DL
MCHC RBC AUTO-ENTMCNC: 32.6 G/DL
MCV RBC AUTO: 101 FL
MCV RBC AUTO: 94 FL
MCV RBC AUTO: 94 FL
MCV RBC AUTO: 95 FL
MCV RBC AUTO: 96 FL
MCV RBC AUTO: 97 FL
MCV RBC AUTO: 98 FL
MCV RBC AUTO: 98 FL
MCV RBC AUTO: 99 FL
METAMYELOCYTES NFR BLD MANUAL: 10 %
METAMYELOCYTES NFR BLD MANUAL: 5 %
MICROSCOPIC COMMENT: ABNORMAL
MITRAL VALVE MOBILITY: NORMAL
MODE: ABNORMAL
MONOCYTES # BLD AUTO: 0.5 K/UL
MONOCYTES # BLD AUTO: 0.5 K/UL
MONOCYTES # BLD AUTO: 0.7 K/UL
MONOCYTES # BLD AUTO: 0.8 K/UL
MONOCYTES # BLD AUTO: 1.5 K/UL
MONOCYTES # BLD AUTO: 1.7 K/UL
MONOCYTES # BLD AUTO: ABNORMAL K/UL
MONOCYTES # BLD AUTO: ABNORMAL K/UL
MONOCYTES NFR BLD: 1 %
MONOCYTES NFR BLD: 5 %
MONOCYTES NFR BLD: 5.2 %
MONOCYTES NFR BLD: 5.3 %
MONOCYTES NFR BLD: 7 %
MONOCYTES NFR BLD: 7.6 %
MONOCYTES NFR BLD: 8.1 %
MONOCYTES NFR BLD: 9.3 %
MYELOCYTES NFR BLD MANUAL: 3 %
NEUTROPHILS # BLD AUTO: 15.3 K/UL
NEUTROPHILS # BLD AUTO: 17.3 K/UL
NEUTROPHILS # BLD AUTO: 7.3 K/UL
NEUTROPHILS # BLD AUTO: 7.9 K/UL
NEUTROPHILS # BLD AUTO: 8.6 K/UL
NEUTROPHILS # BLD AUTO: 8.9 K/UL
NEUTROPHILS # BLD AUTO: ABNORMAL K/UL
NEUTROPHILS NFR BLD: 34 %
NEUTROPHILS NFR BLD: 41 %
NEUTROPHILS NFR BLD: 76.2 %
NEUTROPHILS NFR BLD: 80.9 %
NEUTROPHILS NFR BLD: 82.3 %
NEUTROPHILS NFR BLD: 83.5 %
NEUTROPHILS NFR BLD: 86.1 %
NEUTROPHILS NFR BLD: 88.5 %
NEUTS BAND NFR BLD MANUAL: 34 %
NEUTS BAND NFR BLD MANUAL: 42 %
NITRITE UR QL STRIP: NEGATIVE
NITRITE UR QL STRIP: NEGATIVE
NITRITE UR QL STRIP: POSITIVE
NRBC BLD-RTO: 0 /100 WBC
NRBC BLD-RTO: ABNORMAL /100 WBC
NRBC BLD-RTO: ABNORMAL /100 WBC
NUM UNITS TRANS PACKED RBC: NORMAL
NUM UNITS TRANS PACKED RBC: NORMAL
OVALOCYTES BLD QL SMEAR: ABNORMAL
PCO2 BLDA: 18.9 MMHG (ref 35–45)
PCO2 BLDA: 19.5 MMHG (ref 35–45)
PCO2 BLDA: 26.8 MMHG (ref 35–45)
PCO2 BLDA: 33.2 MMHG (ref 35–45)
PEEP: 5
PEEP: 8
PEEP: 8
PH SMN: 6.99 [PH] (ref 7.35–7.45)
PH SMN: 7.03 [PH] (ref 7.35–7.45)
PH SMN: 7.12 [PH] (ref 7.35–7.45)
PH SMN: 7.38 [PH] (ref 7.35–7.45)
PH UR STRIP: 5 [PH] (ref 5–8)
PHOSPHATE SERPL-MCNC: 10.5 MG/DL
PHOSPHATE SERPL-MCNC: 3.8 MG/DL
PHOSPHATE SERPL-MCNC: 5.4 MG/DL
PHOSPHATE SERPL-MCNC: 7.7 MG/DL
PHOSPHATE SERPL-MCNC: 9.8 MG/DL
PLATELET # BLD AUTO: 191 K/UL
PLATELET # BLD AUTO: 193 K/UL
PLATELET # BLD AUTO: 213 K/UL
PLATELET # BLD AUTO: 216 K/UL
PLATELET # BLD AUTO: 232 K/UL
PLATELET # BLD AUTO: 253 K/UL
PLATELET # BLD AUTO: 256 K/UL
PLATELET # BLD AUTO: 257 K/UL
PLATELET # BLD AUTO: 302 K/UL
PLATELET # BLD AUTO: 382 K/UL
PLATELET # BLD AUTO: 398 K/UL
PLATELET # BLD AUTO: 439 K/UL
PLATELET BLD QL SMEAR: ABNORMAL
PMV BLD AUTO: 10.1 FL
PMV BLD AUTO: 10.2 FL
PMV BLD AUTO: 10.3 FL
PMV BLD AUTO: 10.3 FL
PMV BLD AUTO: 10.4 FL
PMV BLD AUTO: 10.4 FL
PMV BLD AUTO: 10.5 FL
PMV BLD AUTO: 10.6 FL
PMV BLD AUTO: 10.7 FL
PMV BLD AUTO: 10.8 FL
PMV BLD AUTO: 10.8 FL
PMV BLD AUTO: 11.4 FL
PO2 BLDA: 137 MMHG (ref 80–100)
PO2 BLDA: 154 MMHG (ref 80–100)
PO2 BLDA: 65 MMHG (ref 80–100)
PO2 BLDA: 77 MMHG (ref 80–100)
POC BE: -13 MMOL/L
POC BE: -22 MMOL/L
POC BE: -23 MMOL/L
POC BE: -25 MMOL/L
POC IONIZED CALCIUM: 1 MMOL/L (ref 1.06–1.42)
POC IONIZED CALCIUM: 1.17 MMOL/L (ref 1.06–1.42)
POC SATURATED O2: 79 % (ref 95–100)
POC SATURATED O2: 91 % (ref 95–100)
POC SATURATED O2: 97 % (ref 95–100)
POC SATURATED O2: 99 % (ref 95–100)
POC TCO2: 10 MMOL/L (ref 23–27)
POC TCO2: 12 MMOL/L (ref 23–27)
POC TCO2: 7 MMOL/L (ref 23–27)
POC TCO2: 7 MMOL/L (ref 23–27)
POCT GLUCOSE: 119 MG/DL (ref 70–110)
POCT GLUCOSE: 150 MG/DL (ref 70–110)
POCT GLUCOSE: 24 MG/DL (ref 70–110)
POCT GLUCOSE: 283 MG/DL (ref 70–110)
POCT GLUCOSE: 49 MG/DL (ref 70–110)
POIKILOCYTOSIS BLD QL SMEAR: SLIGHT
POIKILOCYTOSIS BLD QL SMEAR: SLIGHT
POLYCHROMASIA BLD QL SMEAR: ABNORMAL
POTASSIUM BLD-SCNC: 4.9 MMOL/L (ref 3.5–5.1)
POTASSIUM BLD-SCNC: 5.4 MMOL/L (ref 3.5–5.1)
POTASSIUM SERPL-SCNC: 4.4 MMOL/L
POTASSIUM SERPL-SCNC: 4.4 MMOL/L
POTASSIUM SERPL-SCNC: 4.5 MMOL/L
POTASSIUM SERPL-SCNC: 4.5 MMOL/L
POTASSIUM SERPL-SCNC: 4.9 MMOL/L
POTASSIUM SERPL-SCNC: 4.9 MMOL/L
POTASSIUM SERPL-SCNC: 5.1 MMOL/L
POTASSIUM SERPL-SCNC: 5.5 MMOL/L
POTASSIUM SERPL-SCNC: 5.5 MMOL/L
POTASSIUM SERPL-SCNC: 5.7 MMOL/L
POTASSIUM SERPL-SCNC: 5.7 MMOL/L
POTASSIUM SERPL-SCNC: 5.8 MMOL/L
POTASSIUM SERPL-SCNC: 5.9 MMOL/L
POTASSIUM SERPL-SCNC: 6.3 MMOL/L
POTASSIUM SERPL-SCNC: 6.8 MMOL/L
POTASSIUM SERPL-SCNC: 6.9 MMOL/L
PROT SERPL-MCNC: 4.5 G/DL
PROT SERPL-MCNC: 5.6 G/DL
PROT SERPL-MCNC: 6 G/DL
PROT SERPL-MCNC: 6.2 G/DL
PROT SERPL-MCNC: 6.9 G/DL
PROT UR QL STRIP: ABNORMAL
PROT UR QL STRIP: NEGATIVE
PROT UR QL STRIP: NEGATIVE
PROTHROMBIN TIME: 12.4 SEC
PROTHROMBIN TIME: 32.5 SEC
PROTHROMBIN TIME: 32.5 SEC
PROTHROMBIN TIME: 9.9 SEC
RBC # BLD AUTO: 1.81 M/UL
RBC # BLD AUTO: 2.18 M/UL
RBC # BLD AUTO: 2.24 M/UL
RBC # BLD AUTO: 2.45 M/UL
RBC # BLD AUTO: 2.54 M/UL
RBC # BLD AUTO: 2.88 M/UL
RBC # BLD AUTO: 3.36 M/UL
RBC # BLD AUTO: 3.42 M/UL
RBC # BLD AUTO: 3.42 M/UL
RBC # BLD AUTO: 3.43 M/UL
RBC # BLD AUTO: 3.62 M/UL
RBC # BLD AUTO: 4.19 M/UL
RBC #/AREA URNS HPF: 7 /HPF (ref 0–4)
RETIRED EF AND QEF - SEE NOTES: 60 (ref 55–65)
SAMPLE: ABNORMAL
SITE: ABNORMAL
SODIUM BLD-SCNC: 126 MMOL/L (ref 136–145)
SODIUM BLD-SCNC: 135 MMOL/L (ref 136–145)
SODIUM SERPL-SCNC: 129 MMOL/L
SODIUM SERPL-SCNC: 130 MMOL/L
SODIUM SERPL-SCNC: 130 MMOL/L
SODIUM SERPL-SCNC: 131 MMOL/L
SODIUM SERPL-SCNC: 132 MMOL/L
SODIUM SERPL-SCNC: 133 MMOL/L
SODIUM SERPL-SCNC: 133 MMOL/L
SODIUM SERPL-SCNC: 135 MMOL/L
SODIUM SERPL-SCNC: 135 MMOL/L
SODIUM SERPL-SCNC: 136 MMOL/L
SODIUM SERPL-SCNC: 137 MMOL/L
SODIUM SERPL-SCNC: 138 MMOL/L
SODIUM SERPL-SCNC: 139 MMOL/L
SODIUM SERPL-SCNC: 139 MMOL/L
SODIUM SERPL-SCNC: 140 MMOL/L
SODIUM SERPL-SCNC: 141 MMOL/L
SP GR UR STRIP: 1.01 (ref 1–1.03)
SP GR UR STRIP: 1.01 (ref 1–1.03)
SP GR UR STRIP: 1.02 (ref 1–1.03)
SQUAMOUS #/AREA URNS HPF: 2 /HPF
TARGETS BLD QL SMEAR: ABNORMAL
TRANS ERYTHROCYTES VOL PATIENT: NORMAL ML
TRANS ERYTHROCYTES VOL PATIENT: NORMAL ML
TROPONIN I SERPL DL<=0.01 NG/ML-MCNC: 0.02 NG/ML
TROPONIN I SERPL DL<=0.01 NG/ML-MCNC: <0.006 NG/ML
URN SPEC COLLECT METH UR: ABNORMAL
URN SPEC COLLECT METH UR: ABNORMAL
URN SPEC COLLECT METH UR: NORMAL
UROBILINOGEN UR STRIP-ACNC: 1 EU/DL
UROBILINOGEN UR STRIP-ACNC: NEGATIVE EU/DL
UROBILINOGEN UR STRIP-ACNC: NEGATIVE EU/DL
VT: 400
VT: 400
VT: 500
WBC # BLD AUTO: 10.28 K/UL
WBC # BLD AUTO: 10.3 K/UL
WBC # BLD AUTO: 10.38 K/UL
WBC # BLD AUTO: 10.41 K/UL
WBC # BLD AUTO: 10.56 K/UL
WBC # BLD AUTO: 18.45 K/UL
WBC # BLD AUTO: 18.55 K/UL
WBC # BLD AUTO: 19.73 K/UL
WBC # BLD AUTO: 25.9 K/UL
WBC # BLD AUTO: 9.24 K/UL
WBC # BLD AUTO: 9.58 K/UL
WBC # BLD AUTO: 9.78 K/UL
WBC #/AREA URNS HPF: 2 /HPF (ref 0–5)
WBC NRBC COR # BLD: 6.24 K/UL

## 2018-01-01 PROCEDURE — 85025 COMPLETE CBC W/AUTO DIFF WBC: CPT

## 2018-01-01 PROCEDURE — 80074 ACUTE HEPATITIS PANEL: CPT

## 2018-01-01 PROCEDURE — 63600175 PHARM REV CODE 636 W HCPCS: Performed by: NURSE PRACTITIONER

## 2018-01-01 PROCEDURE — 25000003 PHARM REV CODE 250

## 2018-01-01 PROCEDURE — 80069 RENAL FUNCTION PANEL: CPT

## 2018-01-01 PROCEDURE — 83735 ASSAY OF MAGNESIUM: CPT

## 2018-01-01 PROCEDURE — 83690 ASSAY OF LIPASE: CPT

## 2018-01-01 PROCEDURE — 36415 COLL VENOUS BLD VENIPUNCTURE: CPT

## 2018-01-01 PROCEDURE — 25000003 PHARM REV CODE 250: Performed by: NURSE PRACTITIONER

## 2018-01-01 PROCEDURE — 86920 COMPATIBILITY TEST SPIN: CPT

## 2018-01-01 PROCEDURE — 63600175 PHARM REV CODE 636 W HCPCS: Performed by: ANESTHESIOLOGY

## 2018-01-01 PROCEDURE — 88307 TISSUE EXAM BY PATHOLOGIST: CPT | Mod: 26,,, | Performed by: PATHOLOGY

## 2018-01-01 PROCEDURE — 25000003 PHARM REV CODE 250: Performed by: HOSPITALIST

## 2018-01-01 PROCEDURE — 93010 ELECTROCARDIOGRAM REPORT: CPT | Mod: ,,, | Performed by: INTERNAL MEDICINE

## 2018-01-01 PROCEDURE — 97605 NEG PRS WND THER DME<=50SQCM: CPT | Mod: ,,, | Performed by: STUDENT IN AN ORGANIZED HEALTH CARE EDUCATION/TRAINING PROGRAM

## 2018-01-01 PROCEDURE — 84100 ASSAY OF PHOSPHORUS: CPT

## 2018-01-01 PROCEDURE — 25000003 PHARM REV CODE 250: Performed by: ANESTHESIOLOGY

## 2018-01-01 PROCEDURE — 25000003 PHARM REV CODE 250: Performed by: INTERNAL MEDICINE

## 2018-01-01 PROCEDURE — 11000001 HC ACUTE MED/SURG PRIVATE ROOM

## 2018-01-01 PROCEDURE — 85014 HEMATOCRIT: CPT

## 2018-01-01 PROCEDURE — 63600175 PHARM REV CODE 636 W HCPCS: Performed by: HOSPITALIST

## 2018-01-01 PROCEDURE — 86704 HEP B CORE ANTIBODY TOTAL: CPT

## 2018-01-01 PROCEDURE — 87086 URINE CULTURE/COLONY COUNT: CPT

## 2018-01-01 PROCEDURE — 94761 N-INVAS EAR/PLS OXIMETRY MLT: CPT

## 2018-01-01 PROCEDURE — 63600175 PHARM REV CODE 636 W HCPCS: Performed by: ORTHOPAEDIC SURGERY

## 2018-01-01 PROCEDURE — 25000003 PHARM REV CODE 250: Performed by: PHYSICIAN ASSISTANT

## 2018-01-01 PROCEDURE — 0QS604Z REPOSITION RIGHT UPPER FEMUR WITH INTERNAL FIXATION DEVICE, OPEN APPROACH: ICD-10-PCS | Performed by: ORTHOPAEDIC SURGERY

## 2018-01-01 PROCEDURE — 84484 ASSAY OF TROPONIN QUANT: CPT

## 2018-01-01 PROCEDURE — 94640 AIRWAY INHALATION TREATMENT: CPT

## 2018-01-01 PROCEDURE — 81003 URINALYSIS AUTO W/O SCOPE: CPT

## 2018-01-01 PROCEDURE — 82803 BLOOD GASES ANY COMBINATION: CPT

## 2018-01-01 PROCEDURE — 86850 RBC ANTIBODY SCREEN: CPT

## 2018-01-01 PROCEDURE — S0030 INJECTION, METRONIDAZOLE: HCPCS | Performed by: HOSPITALIST

## 2018-01-01 PROCEDURE — 63600175 PHARM REV CODE 636 W HCPCS: Performed by: EMERGENCY MEDICINE

## 2018-01-01 PROCEDURE — 97165 OT EVAL LOW COMPLEX 30 MIN: CPT

## 2018-01-01 PROCEDURE — 85730 THROMBOPLASTIN TIME PARTIAL: CPT

## 2018-01-01 PROCEDURE — 25000003 PHARM REV CODE 250: Performed by: EMERGENCY MEDICINE

## 2018-01-01 PROCEDURE — 36000708 HC OR TIME LEV III 1ST 15 MIN: Performed by: STUDENT IN AN ORGANIZED HEALTH CARE EDUCATION/TRAINING PROGRAM

## 2018-01-01 PROCEDURE — 99284 EMERGENCY DEPT VISIT MOD MDM: CPT | Mod: ,,, | Performed by: EMERGENCY MEDICINE

## 2018-01-01 PROCEDURE — 27245 TREAT THIGH FRACTURE: CPT | Mod: RT,,, | Performed by: ORTHOPAEDIC SURGERY

## 2018-01-01 PROCEDURE — 85027 COMPLETE CBC AUTOMATED: CPT | Mod: 91

## 2018-01-01 PROCEDURE — 25500020 PHARM REV CODE 255: Performed by: HOSPITALIST

## 2018-01-01 PROCEDURE — 36430 TRANSFUSION BLD/BLD COMPNT: CPT

## 2018-01-01 PROCEDURE — 93005 ELECTROCARDIOGRAM TRACING: CPT

## 2018-01-01 PROCEDURE — 63600175 PHARM REV CODE 636 W HCPCS: Performed by: INTERNAL MEDICINE

## 2018-01-01 PROCEDURE — 85018 HEMOGLOBIN: CPT

## 2018-01-01 PROCEDURE — 83735 ASSAY OF MAGNESIUM: CPT | Mod: 91

## 2018-01-01 PROCEDURE — 85027 COMPLETE CBC AUTOMATED: CPT

## 2018-01-01 PROCEDURE — 97530 THERAPEUTIC ACTIVITIES: CPT

## 2018-01-01 PROCEDURE — 83880 ASSAY OF NATRIURETIC PEPTIDE: CPT

## 2018-01-01 PROCEDURE — 25000003 PHARM REV CODE 250: Performed by: STUDENT IN AN ORGANIZED HEALTH CARE EDUCATION/TRAINING PROGRAM

## 2018-01-01 PROCEDURE — 99999 PR PBB SHADOW E&M-EST. PATIENT-LVL III: CPT | Mod: PBBFAC,,, | Performed by: FAMILY MEDICINE

## 2018-01-01 PROCEDURE — 85007 BL SMEAR W/DIFF WBC COUNT: CPT | Mod: 91

## 2018-01-01 PROCEDURE — P9021 RED BLOOD CELLS UNIT: HCPCS

## 2018-01-01 PROCEDURE — G8988 SELF CARE GOAL STATUS: HCPCS | Mod: CK

## 2018-01-01 PROCEDURE — 5A1935Z RESPIRATORY VENTILATION, LESS THAN 24 CONSECUTIVE HOURS: ICD-10-PCS | Performed by: STUDENT IN AN ORGANIZED HEALTH CARE EDUCATION/TRAINING PROGRAM

## 2018-01-01 PROCEDURE — 84295 ASSAY OF SERUM SODIUM: CPT

## 2018-01-01 PROCEDURE — 27000221 HC OXYGEN, UP TO 24 HOURS

## 2018-01-01 PROCEDURE — 80048 BASIC METABOLIC PNL TOTAL CA: CPT

## 2018-01-01 PROCEDURE — 25000003 PHARM REV CODE 250: Performed by: ORTHOPAEDIC SURGERY

## 2018-01-01 PROCEDURE — 99499 UNLISTED E&M SERVICE: CPT | Mod: S$GLB,,, | Performed by: FAMILY MEDICINE

## 2018-01-01 PROCEDURE — 96374 THER/PROPH/DIAG INJ IV PUSH: CPT

## 2018-01-01 PROCEDURE — 63600175 PHARM REV CODE 636 W HCPCS: Performed by: STUDENT IN AN ORGANIZED HEALTH CARE EDUCATION/TRAINING PROGRAM

## 2018-01-01 PROCEDURE — 94003 VENT MGMT INPAT SUBQ DAY: CPT

## 2018-01-01 PROCEDURE — 36620 INSERTION CATHETER ARTERY: CPT

## 2018-01-01 PROCEDURE — 99285 EMERGENCY DEPT VISIT HI MDM: CPT | Mod: 25

## 2018-01-01 PROCEDURE — 51702 INSERT TEMP BLADDER CATH: CPT

## 2018-01-01 PROCEDURE — 85049 AUTOMATED PLATELET COUNT: CPT

## 2018-01-01 PROCEDURE — 99900035 HC TECH TIME PER 15 MIN (STAT)

## 2018-01-01 PROCEDURE — 80048 BASIC METABOLIC PNL TOTAL CA: CPT | Mod: 91

## 2018-01-01 PROCEDURE — 63600175 PHARM REV CODE 636 W HCPCS: Performed by: PHYSICIAN ASSISTANT

## 2018-01-01 PROCEDURE — 96360 HYDRATION IV INFUSION INIT: CPT

## 2018-01-01 PROCEDURE — 96361 HYDRATE IV INFUSION ADD-ON: CPT

## 2018-01-01 PROCEDURE — 93306 TTE W/DOPPLER COMPLETE: CPT | Mod: 26,,, | Performed by: INTERNAL MEDICINE

## 2018-01-01 PROCEDURE — 85730 THROMBOPLASTIN TIME PARTIAL: CPT | Mod: 91

## 2018-01-01 PROCEDURE — 85610 PROTHROMBIN TIME: CPT

## 2018-01-01 PROCEDURE — P9016 RBC LEUKOCYTES REDUCED: HCPCS

## 2018-01-01 PROCEDURE — 85007 BL SMEAR W/DIFF WBC COUNT: CPT

## 2018-01-01 PROCEDURE — 99284 EMERGENCY DEPT VISIT MOD MDM: CPT | Mod: 25

## 2018-01-01 PROCEDURE — 25000003 PHARM REV CODE 250: Performed by: SURGERY

## 2018-01-01 PROCEDURE — P9612 CATHETERIZE FOR URINE SPEC: HCPCS

## 2018-01-01 PROCEDURE — 36600 WITHDRAWAL OF ARTERIAL BLOOD: CPT

## 2018-01-01 PROCEDURE — 82330 ASSAY OF CALCIUM: CPT

## 2018-01-01 PROCEDURE — 36000711: Performed by: ORTHOPAEDIC SURGERY

## 2018-01-01 PROCEDURE — 99223 1ST HOSP IP/OBS HIGH 75: CPT | Mod: ,,, | Performed by: ORTHOPAEDIC SURGERY

## 2018-01-01 PROCEDURE — 80053 COMPREHEN METABOLIC PANEL: CPT

## 2018-01-01 PROCEDURE — 97110 THERAPEUTIC EXERCISES: CPT

## 2018-01-01 PROCEDURE — 84100 ASSAY OF PHOSPHORUS: CPT | Mod: 91

## 2018-01-01 PROCEDURE — 27200966 HC CLOSED SUCTION SYSTEM

## 2018-01-01 PROCEDURE — 71000033 HC RECOVERY, INTIAL HOUR: Performed by: ORTHOPAEDIC SURGERY

## 2018-01-01 PROCEDURE — 96376 TX/PRO/DX INJ SAME DRUG ADON: CPT

## 2018-01-01 PROCEDURE — 25000242 PHARM REV CODE 250 ALT 637 W/ HCPCS: Performed by: INTERNAL MEDICINE

## 2018-01-01 PROCEDURE — 2W13X6Z COMPRESSION OF ABDOMINAL WALL USING PRESSURE DRESSING: ICD-10-PCS | Performed by: STUDENT IN AN ORGANIZED HEALTH CARE EDUCATION/TRAINING PROGRAM

## 2018-01-01 PROCEDURE — G8979 MOBILITY GOAL STATUS: HCPCS | Mod: CK

## 2018-01-01 PROCEDURE — P9045 ALBUMIN (HUMAN), 5%, 250 ML: HCPCS | Mod: JG | Performed by: NURSE ANESTHETIST, CERTIFIED REGISTERED

## 2018-01-01 PROCEDURE — 99223 1ST HOSP IP/OBS HIGH 75: CPT | Mod: ,,, | Performed by: STUDENT IN AN ORGANIZED HEALTH CARE EDUCATION/TRAINING PROGRAM

## 2018-01-01 PROCEDURE — 37000009 HC ANESTHESIA EA ADD 15 MINS: Performed by: STUDENT IN AN ORGANIZED HEALTH CARE EDUCATION/TRAINING PROGRAM

## 2018-01-01 PROCEDURE — 96375 TX/PRO/DX INJ NEW DRUG ADDON: CPT

## 2018-01-01 PROCEDURE — 97161 PT EVAL LOW COMPLEX 20 MIN: CPT

## 2018-01-01 PROCEDURE — 37000009 HC ANESTHESIA EA ADD 15 MINS: Performed by: ORTHOPAEDIC SURGERY

## 2018-01-01 PROCEDURE — 36000709 HC OR TIME LEV III EA ADD 15 MIN: Performed by: STUDENT IN AN ORGANIZED HEALTH CARE EDUCATION/TRAINING PROGRAM

## 2018-01-01 PROCEDURE — 63600175 PHARM REV CODE 636 W HCPCS: Mod: JG | Performed by: NURSE ANESTHETIST, CERTIFIED REGISTERED

## 2018-01-01 PROCEDURE — 25000003 PHARM REV CODE 250: Performed by: NURSE ANESTHETIST, CERTIFIED REGISTERED

## 2018-01-01 PROCEDURE — 27201423 OPTIME MED/SURG SUP & DEVICES STERILE SUPPLY: Performed by: ORTHOPAEDIC SURGERY

## 2018-01-01 PROCEDURE — 86706 HEP B SURFACE ANTIBODY: CPT

## 2018-01-01 PROCEDURE — 36000710: Performed by: ORTHOPAEDIC SURGERY

## 2018-01-01 PROCEDURE — 20000000 HC ICU ROOM

## 2018-01-01 PROCEDURE — 86580 TB INTRADERMAL TEST: CPT | Performed by: PHYSICIAN ASSISTANT

## 2018-01-01 PROCEDURE — 93306 TTE W/DOPPLER COMPLETE: CPT

## 2018-01-01 PROCEDURE — S0028 INJECTION, FAMOTIDINE, 20 MG: HCPCS | Performed by: INTERNAL MEDICINE

## 2018-01-01 PROCEDURE — 37799 UNLISTED PX VASCULAR SURGERY: CPT

## 2018-01-01 PROCEDURE — 83605 ASSAY OF LACTIC ACID: CPT

## 2018-01-01 PROCEDURE — C1713 ANCHOR/SCREW BN/BN,TIS/BN: HCPCS | Performed by: ORTHOPAEDIC SURGERY

## 2018-01-01 PROCEDURE — 76937 US GUIDE VASCULAR ACCESS: CPT | Performed by: ANESTHESIOLOGY

## 2018-01-01 PROCEDURE — 83605 ASSAY OF LACTIC ACID: CPT | Mod: 91

## 2018-01-01 PROCEDURE — 81000 URINALYSIS NONAUTO W/SCOPE: CPT

## 2018-01-01 PROCEDURE — 85520 HEPARIN ASSAY: CPT

## 2018-01-01 PROCEDURE — 88307 TISSUE EXAM BY PATHOLOGIST: CPT | Performed by: PATHOLOGY

## 2018-01-01 PROCEDURE — 63600175 PHARM REV CODE 636 W HCPCS

## 2018-01-01 PROCEDURE — 37000008 HC ANESTHESIA 1ST 15 MINUTES: Performed by: ORTHOPAEDIC SURGERY

## 2018-01-01 PROCEDURE — 94002 VENT MGMT INPAT INIT DAY: CPT

## 2018-01-01 PROCEDURE — 37000008 HC ANESTHESIA 1ST 15 MINUTES: Performed by: STUDENT IN AN ORGANIZED HEALTH CARE EDUCATION/TRAINING PROGRAM

## 2018-01-01 PROCEDURE — 44150 REMOVAL OF COLON: CPT | Mod: ,,, | Performed by: STUDENT IN AN ORGANIZED HEALTH CARE EDUCATION/TRAINING PROGRAM

## 2018-01-01 PROCEDURE — G8980 MOBILITY D/C STATUS: HCPCS | Mod: CM

## 2018-01-01 PROCEDURE — G8978 MOBILITY CURRENT STATUS: HCPCS | Mod: CM

## 2018-01-01 PROCEDURE — 82550 ASSAY OF CK (CPK): CPT

## 2018-01-01 PROCEDURE — 27201423 OPTIME MED/SURG SUP & DEVICES STERILE SUPPLY: Performed by: STUDENT IN AN ORGANIZED HEALTH CARE EDUCATION/TRAINING PROGRAM

## 2018-01-01 PROCEDURE — 90945 DIALYSIS ONE EVALUATION: CPT

## 2018-01-01 PROCEDURE — 86901 BLOOD TYPING SEROLOGIC RH(D): CPT

## 2018-01-01 PROCEDURE — 80076 HEPATIC FUNCTION PANEL: CPT

## 2018-01-01 PROCEDURE — 0DB80ZZ EXCISION OF SMALL INTESTINE, OPEN APPROACH: ICD-10-PCS | Performed by: STUDENT IN AN ORGANIZED HEALTH CARE EDUCATION/TRAINING PROGRAM

## 2018-01-01 PROCEDURE — 71000039 HC RECOVERY, EACH ADD'L HOUR: Performed by: ORTHOPAEDIC SURGERY

## 2018-01-01 PROCEDURE — 99214 OFFICE O/P EST MOD 30 MIN: CPT | Mod: S$GLB,,, | Performed by: FAMILY MEDICINE

## 2018-01-01 PROCEDURE — 84132 ASSAY OF SERUM POTASSIUM: CPT

## 2018-01-01 PROCEDURE — 05HN33Z INSERTION OF INFUSION DEVICE INTO LEFT INTERNAL JUGULAR VEIN, PERCUTANEOUS APPROACH: ICD-10-PCS | Performed by: STUDENT IN AN ORGANIZED HEALTH CARE EDUCATION/TRAINING PROGRAM

## 2018-01-01 PROCEDURE — 36556 INSERT NON-TUNNEL CV CATH: CPT

## 2018-01-01 PROCEDURE — 0DTE0ZZ RESECTION OF LARGE INTESTINE, OPEN APPROACH: ICD-10-PCS | Performed by: STUDENT IN AN ORGANIZED HEALTH CARE EDUCATION/TRAINING PROGRAM

## 2018-01-01 PROCEDURE — 0BH17EZ INSERTION OF ENDOTRACHEAL AIRWAY INTO TRACHEA, VIA NATURAL OR ARTIFICIAL OPENING: ICD-10-PCS | Performed by: STUDENT IN AN ORGANIZED HEALTH CARE EDUCATION/TRAINING PROGRAM

## 2018-01-01 PROCEDURE — 99284 EMERGENCY DEPT VISIT MOD MDM: CPT

## 2018-01-01 PROCEDURE — G8987 SELF CARE CURRENT STATUS: HCPCS | Mod: CL

## 2018-01-01 DEVICE — SCREW BONE HIP 95MM TI: Type: IMPLANTABLE DEVICE | Site: HIP | Status: FUNCTIONAL

## 2018-01-01 DEVICE — SCREW LOCK T25 5.0X38MM: Type: IMPLANTABLE DEVICE | Site: HIP | Status: FUNCTIONAL

## 2018-01-01 DEVICE — NAIL TFNA 130DEG 11MM L STRL: Type: IMPLANTABLE DEVICE | Site: HIP | Status: FUNCTIONAL

## 2018-01-01 RX ORDER — NEOSTIGMINE METHYLSULFATE 1 MG/ML
INJECTION, SOLUTION INTRAVENOUS
Status: DISCONTINUED | OUTPATIENT
Start: 2018-01-01 | End: 2018-01-01

## 2018-01-01 RX ORDER — NOREPINEPHRINE BITARTRATE 1 MG/ML
INJECTION, SOLUTION INTRAVENOUS
Status: COMPLETED
Start: 2018-01-01 | End: 2018-01-01

## 2018-01-01 RX ORDER — INSULIN ASPART 100 [IU]/ML
0-5 INJECTION, SOLUTION INTRAVENOUS; SUBCUTANEOUS EVERY 6 HOURS PRN
Status: DISCONTINUED | OUTPATIENT
Start: 2018-01-01 | End: 2018-02-03 | Stop reason: HOSPADM

## 2018-01-01 RX ORDER — MAGNESIUM SULFATE HEPTAHYDRATE 40 MG/ML
2 INJECTION, SOLUTION INTRAVENOUS
Status: DISCONTINUED | OUTPATIENT
Start: 2018-01-01 | End: 2018-02-03 | Stop reason: HOSPADM

## 2018-01-01 RX ORDER — ALBUMIN HUMAN 50 G/1000ML
SOLUTION INTRAVENOUS CONTINUOUS PRN
Status: DISCONTINUED | OUTPATIENT
Start: 2018-01-01 | End: 2018-01-01

## 2018-01-01 RX ORDER — ONDANSETRON HYDROCHLORIDE 2 MG/ML
INJECTION, SOLUTION INTRAMUSCULAR; INTRAVENOUS
Status: DISCONTINUED | OUTPATIENT
Start: 2018-01-01 | End: 2018-01-01

## 2018-01-01 RX ORDER — HYDROCODONE BITARTRATE AND ACETAMINOPHEN 5; 325 MG/1; MG/1
1 TABLET ORAL EVERY 6 HOURS PRN
Status: DISCONTINUED | OUTPATIENT
Start: 2018-01-01 | End: 2018-01-01

## 2018-01-01 RX ORDER — SODIUM BICARBONATE 1 MEQ/ML
25 SYRINGE (ML) INTRAVENOUS ONCE
Status: COMPLETED | OUTPATIENT
Start: 2018-01-01 | End: 2018-01-01

## 2018-01-01 RX ORDER — MULTIVITAMIN
1 TABLET ORAL DAILY
COMMUNITY

## 2018-01-01 RX ORDER — SODIUM BICARBONATE 1 MEQ/ML
SYRINGE (ML) INTRAVENOUS CODE/TRAUMA/SEDATION MEDICATION
Status: COMPLETED | OUTPATIENT
Start: 2018-01-01 | End: 2018-01-01

## 2018-01-01 RX ORDER — SODIUM CHLORIDE 9 MG/ML
INJECTION, SOLUTION INTRAVENOUS ONCE
Status: COMPLETED | OUTPATIENT
Start: 2018-01-01 | End: 2018-01-01

## 2018-01-01 RX ORDER — FENTANYL CITRATE 50 UG/ML
50 INJECTION, SOLUTION INTRAMUSCULAR; INTRAVENOUS
Status: COMPLETED | OUTPATIENT
Start: 2018-01-01 | End: 2018-01-01

## 2018-01-01 RX ORDER — FENTANYL CITRATE 50 UG/ML
INJECTION, SOLUTION INTRAMUSCULAR; INTRAVENOUS
Status: DISCONTINUED | OUTPATIENT
Start: 2018-01-01 | End: 2018-01-01

## 2018-01-01 RX ORDER — CALCIUM CHLORIDE INJECTION 100 MG/ML
INJECTION, SOLUTION INTRAVENOUS CODE/TRAUMA/SEDATION MEDICATION
Status: COMPLETED | OUTPATIENT
Start: 2018-01-01 | End: 2018-01-01

## 2018-01-01 RX ORDER — PROPOFOL 10 MG/ML
VIAL (ML) INTRAVENOUS
Status: DISCONTINUED | OUTPATIENT
Start: 2018-01-01 | End: 2018-01-01

## 2018-01-01 RX ORDER — CLONIDINE HYDROCHLORIDE 0.1 MG/1
0.1 TABLET ORAL EVERY 6 HOURS PRN
Status: DISCONTINUED | OUTPATIENT
Start: 2018-01-01 | End: 2018-01-01

## 2018-01-01 RX ORDER — HEPARIN SODIUM,PORCINE/D5W 25000/250
16 INTRAVENOUS SOLUTION INTRAVENOUS CONTINUOUS
Status: DISCONTINUED | OUTPATIENT
Start: 2018-01-01 | End: 2018-01-01

## 2018-01-01 RX ORDER — PROMETHAZINE HYDROCHLORIDE 25 MG/ML
INJECTION, SOLUTION INTRAMUSCULAR; INTRAVENOUS
Status: DISPENSED
Start: 2018-01-01 | End: 2018-01-01

## 2018-01-01 RX ORDER — SODIUM CHLORIDE 9 MG/ML
INJECTION, SOLUTION INTRAVENOUS CONTINUOUS
Status: DISCONTINUED | OUTPATIENT
Start: 2018-01-01 | End: 2018-01-01

## 2018-01-01 RX ORDER — HYDROCODONE BITARTRATE AND ACETAMINOPHEN 500; 5 MG/1; MG/1
TABLET ORAL
Status: DISCONTINUED | OUTPATIENT
Start: 2018-01-01 | End: 2018-02-03 | Stop reason: HOSPADM

## 2018-01-01 RX ORDER — CHLORHEXIDINE GLUCONATE ORAL RINSE 1.2 MG/ML
15 SOLUTION DENTAL 2 TIMES DAILY
Status: DISCONTINUED | OUTPATIENT
Start: 2018-01-01 | End: 2018-02-03 | Stop reason: HOSPADM

## 2018-01-01 RX ORDER — RAMELTEON 8 MG/1
8 TABLET ORAL NIGHTLY PRN
Status: DISCONTINUED | OUTPATIENT
Start: 2018-01-01 | End: 2018-01-01

## 2018-01-01 RX ORDER — ENOXAPARIN SODIUM 100 MG/ML
30 INJECTION SUBCUTANEOUS EVERY 24 HOURS
Status: DISCONTINUED | OUTPATIENT
Start: 2018-01-01 | End: 2018-01-01

## 2018-01-01 RX ORDER — DEXMEDETOMIDINE HYDROCHLORIDE 4 UG/ML
0.2 INJECTION, SOLUTION INTRAVENOUS CONTINUOUS
Status: DISCONTINUED | OUTPATIENT
Start: 2018-01-01 | End: 2018-02-03 | Stop reason: HOSPADM

## 2018-01-01 RX ORDER — LACTULOSE 10 G/15ML
20 SOLUTION ORAL
Status: DISCONTINUED | OUTPATIENT
Start: 2018-01-01 | End: 2018-01-01

## 2018-01-01 RX ORDER — SODIUM CHLORIDE, SODIUM LACTATE, POTASSIUM CHLORIDE, CALCIUM CHLORIDE 600; 310; 30; 20 MG/100ML; MG/100ML; MG/100ML; MG/100ML
INJECTION, SOLUTION INTRAVENOUS CONTINUOUS PRN
Status: DISCONTINUED | OUTPATIENT
Start: 2018-01-01 | End: 2018-01-01

## 2018-01-01 RX ORDER — HYDROCODONE BITARTRATE AND ACETAMINOPHEN 5; 325 MG/1; MG/1
1 TABLET ORAL EVERY 8 HOURS PRN
Qty: 10 TABLET | Refills: 0 | Status: SHIPPED | OUTPATIENT
Start: 2018-01-01

## 2018-01-01 RX ORDER — HYDROCODONE BITARTRATE AND ACETAMINOPHEN 5; 325 MG/1; MG/1
1 TABLET ORAL EVERY 4 HOURS PRN
Status: DISCONTINUED | OUTPATIENT
Start: 2018-01-01 | End: 2018-01-01

## 2018-01-01 RX ORDER — METOCLOPRAMIDE HYDROCHLORIDE 5 MG/ML
10 INJECTION INTRAMUSCULAR; INTRAVENOUS EVERY 6 HOURS
Status: DISCONTINUED | OUTPATIENT
Start: 2018-01-01 | End: 2018-01-01

## 2018-01-01 RX ORDER — FUROSEMIDE 20 MG/1
20 TABLET ORAL
Qty: 30 TABLET | Refills: 0 | Status: SHIPPED | OUTPATIENT
Start: 2018-01-01 | End: 2019-01-31

## 2018-01-01 RX ORDER — ACETAMINOPHEN 10 MG/ML
INJECTION, SOLUTION INTRAVENOUS
Status: DISCONTINUED | OUTPATIENT
Start: 2018-01-01 | End: 2018-01-01

## 2018-01-01 RX ORDER — POLYETHYLENE GLYCOL 3350 17 G/17G
17 POWDER, FOR SOLUTION ORAL DAILY
Status: DISCONTINUED | OUTPATIENT
Start: 2018-01-01 | End: 2018-01-01

## 2018-01-01 RX ORDER — METRONIDAZOLE 500 MG/100ML
500 INJECTION, SOLUTION INTRAVENOUS
Status: DISCONTINUED | OUTPATIENT
Start: 2018-01-01 | End: 2018-02-03 | Stop reason: HOSPADM

## 2018-01-01 RX ORDER — ACETAMINOPHEN 325 MG/1
650 TABLET ORAL EVERY 6 HOURS PRN
Status: DISCONTINUED | OUTPATIENT
Start: 2018-01-01 | End: 2018-02-03 | Stop reason: HOSPADM

## 2018-01-01 RX ORDER — HYDRALAZINE HYDROCHLORIDE 20 MG/ML
10 INJECTION INTRAMUSCULAR; INTRAVENOUS EVERY 8 HOURS PRN
Status: DISCONTINUED | OUTPATIENT
Start: 2018-01-01 | End: 2018-01-01

## 2018-01-01 RX ORDER — MORPHINE SULFATE 2 MG/ML
2 INJECTION, SOLUTION INTRAMUSCULAR; INTRAVENOUS EVERY 4 HOURS PRN
Status: DISCONTINUED | OUTPATIENT
Start: 2018-01-01 | End: 2018-01-01

## 2018-01-01 RX ORDER — SODIUM CHLORIDE, SODIUM LACTATE, POTASSIUM CHLORIDE, CALCIUM CHLORIDE 600; 310; 30; 20 MG/100ML; MG/100ML; MG/100ML; MG/100ML
1000 INJECTION, SOLUTION INTRAVENOUS
Status: COMPLETED | OUTPATIENT
Start: 2018-01-01 | End: 2018-01-01

## 2018-01-01 RX ORDER — SODIUM CHLORIDE 0.9 % (FLUSH) 0.9 %
3 SYRINGE (ML) INJECTION
Status: DISCONTINUED | OUTPATIENT
Start: 2018-01-01 | End: 2018-02-03 | Stop reason: HOSPADM

## 2018-01-01 RX ORDER — ENOXAPARIN SODIUM 100 MG/ML
30 INJECTION SUBCUTANEOUS DAILY
Qty: 30 SYRINGE | Refills: 0 | Status: SHIPPED | OUTPATIENT
Start: 2018-01-01

## 2018-01-01 RX ORDER — LACTULOSE 10 G/15ML
20 SOLUTION ORAL ONCE
Status: COMPLETED | OUTPATIENT
Start: 2018-01-01 | End: 2018-01-01

## 2018-01-01 RX ORDER — ONDANSETRON 2 MG/ML
INJECTION INTRAMUSCULAR; INTRAVENOUS
Status: DISCONTINUED | OUTPATIENT
Start: 2018-01-01 | End: 2018-01-01

## 2018-01-01 RX ORDER — DIPHENOXYLATE HYDROCHLORIDE AND ATROPINE SULFATE 2.5; .025 MG/1; MG/1
1 TABLET ORAL 4 TIMES DAILY PRN
Qty: 30 TABLET | Refills: 2 | Status: SHIPPED | OUTPATIENT
Start: 2018-01-01 | End: 2018-01-01

## 2018-01-01 RX ORDER — LACTULOSE 10 G/15ML
20 SOLUTION ORAL DAILY
Start: 2018-01-01 | End: 2018-02-10

## 2018-01-01 RX ORDER — GLYCOPYRROLATE 0.2 MG/ML
INJECTION INTRAMUSCULAR; INTRAVENOUS
Status: DISCONTINUED | OUTPATIENT
Start: 2018-01-01 | End: 2018-01-01

## 2018-01-01 RX ORDER — ROCURONIUM BROMIDE 10 MG/ML
INJECTION, SOLUTION INTRAVENOUS
Status: DISCONTINUED | OUTPATIENT
Start: 2018-01-01 | End: 2018-01-01

## 2018-01-01 RX ORDER — LIDOCAINE HCL/PF 100 MG/5ML
SYRINGE (ML) INTRAVENOUS
Status: DISCONTINUED | OUTPATIENT
Start: 2018-01-01 | End: 2018-01-01

## 2018-01-01 RX ORDER — FENTANYL CITRATE 50 UG/ML
25 INJECTION, SOLUTION INTRAMUSCULAR; INTRAVENOUS
Status: COMPLETED | OUTPATIENT
Start: 2018-01-01 | End: 2018-01-01

## 2018-01-01 RX ORDER — FENTANYL CITRATE 50 UG/ML
50 INJECTION, SOLUTION INTRAMUSCULAR; INTRAVENOUS
Status: DISCONTINUED | OUTPATIENT
Start: 2018-01-01 | End: 2018-02-03 | Stop reason: HOSPADM

## 2018-01-01 RX ORDER — ACETAMINOPHEN 325 MG/1
650 TABLET ORAL
Status: COMPLETED | OUTPATIENT
Start: 2018-01-01 | End: 2018-01-01

## 2018-01-01 RX ORDER — FENTANYL CITRATE 50 UG/ML
25 INJECTION, SOLUTION INTRAMUSCULAR; INTRAVENOUS ONCE
Status: DISCONTINUED | OUTPATIENT
Start: 2018-01-01 | End: 2018-01-01

## 2018-01-01 RX ORDER — HYDROCODONE BITARTRATE AND ACETAMINOPHEN 5; 325 MG/1; MG/1
1 TABLET ORAL EVERY 8 HOURS PRN
Qty: 10 TABLET | Refills: 0 | Status: SHIPPED | OUTPATIENT
Start: 2018-01-01 | End: 2018-01-01

## 2018-01-01 RX ORDER — GLUCAGON 1 MG
1 KIT INJECTION
Status: DISCONTINUED | OUTPATIENT
Start: 2018-01-01 | End: 2018-02-03 | Stop reason: HOSPADM

## 2018-01-01 RX ORDER — ONDANSETRON 2 MG/ML
4 INJECTION INTRAMUSCULAR; INTRAVENOUS EVERY 6 HOURS PRN
Status: DISCONTINUED | OUTPATIENT
Start: 2018-01-01 | End: 2018-02-03 | Stop reason: HOSPADM

## 2018-01-01 RX ORDER — DEXAMETHASONE SODIUM PHOSPHATE 4 MG/ML
INJECTION, SOLUTION INTRA-ARTICULAR; INTRALESIONAL; INTRAMUSCULAR; INTRAVENOUS; SOFT TISSUE
Status: DISCONTINUED | OUTPATIENT
Start: 2018-01-01 | End: 2018-01-01

## 2018-01-01 RX ORDER — DEXTROSE 50 % IN WATER (D50W) INTRAVENOUS SYRINGE
Status: COMPLETED
Start: 2018-01-01 | End: 2018-01-01

## 2018-01-01 RX ORDER — HYDRALAZINE HYDROCHLORIDE 10 MG/1
10 TABLET, FILM COATED ORAL EVERY 12 HOURS
Status: DISCONTINUED | OUTPATIENT
Start: 2018-01-01 | End: 2018-01-01

## 2018-01-01 RX ORDER — CEFAZOLIN SODIUM 1 G/3ML
1 INJECTION, POWDER, FOR SOLUTION INTRAMUSCULAR; INTRAVENOUS
Status: DISCONTINUED | OUTPATIENT
Start: 2018-01-01 | End: 2018-01-01

## 2018-01-01 RX ORDER — SODIUM CHLORIDE 0.9 % (FLUSH) 0.9 %
3 SYRINGE (ML) INJECTION
Status: DISCONTINUED | OUTPATIENT
Start: 2018-01-01 | End: 2018-01-01

## 2018-01-01 RX ORDER — FUROSEMIDE 40 MG/1
40 TABLET ORAL DAILY
Status: DISCONTINUED | OUTPATIENT
Start: 2018-01-01 | End: 2018-01-01

## 2018-01-01 RX ORDER — CEFAZOLIN SODIUM 2 G/50ML
2 SOLUTION INTRAVENOUS ONCE
Status: DISCONTINUED | OUTPATIENT
Start: 2018-01-01 | End: 2018-01-01

## 2018-01-01 RX ORDER — SODIUM BICARBONATE 1 MEQ/ML
SYRINGE (ML) INTRAVENOUS
Status: DISCONTINUED | OUTPATIENT
Start: 2018-01-01 | End: 2018-01-01

## 2018-01-01 RX ORDER — PROCHLORPERAZINE EDISYLATE 5 MG/ML
10 INJECTION INTRAMUSCULAR; INTRAVENOUS
Status: COMPLETED | OUTPATIENT
Start: 2018-01-01 | End: 2018-01-01

## 2018-01-01 RX ORDER — NITROGLYCERIN 0.4 MG/1
TABLET SUBLINGUAL
Status: COMPLETED
Start: 2018-01-01 | End: 2018-01-01

## 2018-01-01 RX ORDER — SODIUM CHLORIDE 9 MG/ML
INJECTION, SOLUTION INTRAVENOUS CONTINUOUS PRN
Status: DISCONTINUED | OUTPATIENT
Start: 2018-01-01 | End: 2018-01-01

## 2018-01-01 RX ORDER — METHOCARBAMOL 500 MG/1
500 TABLET, FILM COATED ORAL 2 TIMES DAILY PRN
Status: DISCONTINUED | OUTPATIENT
Start: 2018-01-01 | End: 2018-02-03 | Stop reason: HOSPADM

## 2018-01-01 RX ORDER — DIPHENOXYLATE HYDROCHLORIDE AND ATROPINE SULFATE 2.5; .025 MG/1; MG/1
1 TABLET ORAL 4 TIMES DAILY PRN
Qty: 30 TABLET | Refills: 2 | Status: SHIPPED | OUTPATIENT
Start: 2018-01-01 | End: 2018-01-01 | Stop reason: SDUPTHER

## 2018-01-01 RX ORDER — HYDROCODONE BITARTRATE AND ACETAMINOPHEN 10; 325 MG/1; MG/1
1 TABLET ORAL EVERY 6 HOURS PRN
Status: DISCONTINUED | OUTPATIENT
Start: 2018-01-01 | End: 2018-01-01

## 2018-01-01 RX ORDER — FAMOTIDINE 10 MG/ML
20 INJECTION INTRAVENOUS DAILY
Status: DISCONTINUED | OUTPATIENT
Start: 2018-01-01 | End: 2018-02-03 | Stop reason: HOSPADM

## 2018-01-01 RX ORDER — ETOMIDATE 2 MG/ML
INJECTION INTRAVENOUS
Status: DISCONTINUED | OUTPATIENT
Start: 2018-01-01 | End: 2018-01-01

## 2018-01-01 RX ORDER — SODIUM BICARBONATE 1 MEQ/ML
50 SYRINGE (ML) INTRAVENOUS ONCE
Status: COMPLETED | OUTPATIENT
Start: 2018-01-01 | End: 2018-01-01

## 2018-01-01 RX ORDER — EPINEPHRINE 1 MG/ML
INJECTION INTRAMUSCULAR; INTRAVENOUS; SUBCUTANEOUS CODE/TRAUMA/SEDATION MEDICATION
Status: COMPLETED | OUTPATIENT
Start: 2018-01-01 | End: 2018-01-01

## 2018-01-01 RX ORDER — AMOXICILLIN 250 MG
1 CAPSULE ORAL 2 TIMES DAILY
Status: DISCONTINUED | OUTPATIENT
Start: 2018-01-01 | End: 2018-01-01

## 2018-01-01 RX ORDER — PHENYLEPHRINE HYDROCHLORIDE 10 MG/ML
INJECTION INTRAVENOUS
Status: DISCONTINUED | OUTPATIENT
Start: 2018-01-01 | End: 2018-01-01

## 2018-01-01 RX ORDER — VECURONIUM BROMIDE FOR INJECTION 1 MG/ML
INJECTION, POWDER, LYOPHILIZED, FOR SOLUTION INTRAVENOUS
Status: DISCONTINUED | OUTPATIENT
Start: 2018-01-01 | End: 2018-01-01

## 2018-01-01 RX ORDER — NAPROXEN SODIUM 220 MG/1
81 TABLET, FILM COATED ORAL DAILY
Status: DISCONTINUED | OUTPATIENT
Start: 2018-01-01 | End: 2018-01-01

## 2018-01-01 RX ORDER — LABETALOL HYDROCHLORIDE 5 MG/ML
10 INJECTION, SOLUTION INTRAVENOUS EVERY 4 HOURS PRN
Status: DISCONTINUED | OUTPATIENT
Start: 2018-01-01 | End: 2018-01-01

## 2018-01-01 RX ORDER — LISINOPRIL 20 MG/1
40 TABLET ORAL DAILY
Status: DISCONTINUED | OUTPATIENT
Start: 2018-01-01 | End: 2018-01-01

## 2018-01-01 RX ORDER — SUCCINYLCHOLINE CHLORIDE 20 MG/ML
INJECTION INTRAMUSCULAR; INTRAVENOUS
Status: DISCONTINUED | OUTPATIENT
Start: 2018-01-01 | End: 2018-01-01

## 2018-01-01 RX ORDER — CEFEPIME HYDROCHLORIDE 2 G/1
2 INJECTION, POWDER, FOR SOLUTION INTRAVENOUS
Status: DISCONTINUED | OUTPATIENT
Start: 2018-01-01 | End: 2018-02-03 | Stop reason: HOSPADM

## 2018-01-01 RX ORDER — HYDROCODONE BITARTRATE AND ACETAMINOPHEN 500; 5 MG/1; MG/1
TABLET ORAL CONTINUOUS
Status: DISCONTINUED | OUTPATIENT
Start: 2018-01-01 | End: 2018-02-03 | Stop reason: HOSPADM

## 2018-01-01 RX ORDER — NITROGLYCERIN 0.4 MG/1
0.4 TABLET SUBLINGUAL EVERY 5 MIN PRN
Status: DISCONTINUED | OUTPATIENT
Start: 2018-01-01 | End: 2018-02-03 | Stop reason: HOSPADM

## 2018-01-01 RX ORDER — OMEPRAZOLE 20 MG/1
20 CAPSULE, DELAYED RELEASE ORAL DAILY
Qty: 90 CAPSULE | Refills: 0 | Status: SHIPPED | OUTPATIENT
Start: 2018-01-01 | End: 2019-01-08

## 2018-01-01 RX ORDER — ALBUTEROL SULFATE 0.83 MG/ML
5 SOLUTION RESPIRATORY (INHALATION) ONCE
Status: COMPLETED | OUTPATIENT
Start: 2018-01-01 | End: 2018-01-01

## 2018-01-01 RX ORDER — BISACODYL 10 MG
10 SUPPOSITORY, RECTAL RECTAL DAILY PRN
Status: DISCONTINUED | OUTPATIENT
Start: 2018-01-01 | End: 2018-01-01

## 2018-01-01 RX ORDER — FENTANYL CITRATE 50 UG/ML
25 INJECTION, SOLUTION INTRAMUSCULAR; INTRAVENOUS EVERY 4 HOURS PRN
Status: DISCONTINUED | OUTPATIENT
Start: 2018-01-01 | End: 2018-02-03 | Stop reason: HOSPADM

## 2018-01-01 RX ORDER — FERROUS SULFATE 325(65) MG
325 TABLET, DELAYED RELEASE (ENTERIC COATED) ORAL DAILY
Status: DISCONTINUED | OUTPATIENT
Start: 2018-01-01 | End: 2018-01-01

## 2018-01-01 RX ORDER — FUROSEMIDE 20 MG/1
20 TABLET ORAL DAILY
Status: DISCONTINUED | OUTPATIENT
Start: 2018-01-01 | End: 2018-01-01

## 2018-01-01 RX ORDER — NOREPINEPHRINE BITARTRATE/D5W 16MG/250ML
0.02 PLASTIC BAG, INJECTION (ML) INTRAVENOUS CONTINUOUS
Status: DISCONTINUED | OUTPATIENT
Start: 2018-01-01 | End: 2018-01-01

## 2018-01-01 RX ORDER — HYDRALAZINE HYDROCHLORIDE 10 MG/1
10 TABLET, FILM COATED ORAL EVERY 12 HOURS
Qty: 60 TABLET | Refills: 11 | Status: SHIPPED | OUTPATIENT
Start: 2018-01-01 | End: 2019-01-31

## 2018-01-01 RX ORDER — HYDROMORPHONE HYDROCHLORIDE 2 MG/ML
0.5 INJECTION, SOLUTION INTRAMUSCULAR; INTRAVENOUS; SUBCUTANEOUS ONCE
Status: COMPLETED | OUTPATIENT
Start: 2018-01-01 | End: 2018-01-01

## 2018-01-01 RX ORDER — AMOXICILLIN 250 MG
1 CAPSULE ORAL 2 TIMES DAILY
COMMUNITY
Start: 2018-01-01

## 2018-01-01 RX ORDER — CHOLECALCIFEROL (VITAMIN D3) 25 MCG
1000 TABLET ORAL DAILY
Status: DISCONTINUED | OUTPATIENT
Start: 2018-01-01 | End: 2018-01-01

## 2018-01-01 RX ORDER — ASCORBIC ACID 500 MG
500 TABLET ORAL DAILY
COMMUNITY

## 2018-01-01 RX ORDER — DEXTROSE MONOHYDRATE, SODIUM CHLORIDE, AND POTASSIUM CHLORIDE 50; 1.49; 4.5 G/1000ML; G/1000ML; G/1000ML
INJECTION, SOLUTION INTRAVENOUS CONTINUOUS
Status: DISCONTINUED | OUTPATIENT
Start: 2018-01-01 | End: 2018-01-01

## 2018-01-01 RX ORDER — CEFEPIME HYDROCHLORIDE 2 G/1
2 INJECTION, POWDER, FOR SOLUTION INTRAVENOUS
Status: DISCONTINUED | OUTPATIENT
Start: 2018-01-01 | End: 2018-01-01

## 2018-01-01 RX ORDER — HYDROMORPHONE HYDROCHLORIDE 2 MG/ML
0.2 INJECTION, SOLUTION INTRAMUSCULAR; INTRAVENOUS; SUBCUTANEOUS EVERY 5 MIN PRN
Status: DISCONTINUED | OUTPATIENT
Start: 2018-01-01 | End: 2018-01-01

## 2018-01-01 RX ORDER — HYDROCODONE BITARTRATE AND ACETAMINOPHEN 10; 325 MG/1; MG/1
1 TABLET ORAL EVERY 4 HOURS PRN
Status: DISCONTINUED | OUTPATIENT
Start: 2018-01-01 | End: 2018-01-01

## 2018-01-01 RX ORDER — PANTOPRAZOLE SODIUM 40 MG/1
40 TABLET, DELAYED RELEASE ORAL DAILY
Status: DISCONTINUED | OUTPATIENT
Start: 2018-01-01 | End: 2018-01-01

## 2018-01-01 RX ORDER — ASCORBIC ACID 500 MG
500 TABLET ORAL DAILY
Status: DISCONTINUED | OUTPATIENT
Start: 2018-01-01 | End: 2018-01-01

## 2018-01-01 RX ADMIN — VECURONIUM BROMIDE FOR INJECTION 4 MG: 1 INJECTION, POWDER, LYOPHILIZED, FOR SOLUTION INTRAVENOUS at 02:02

## 2018-01-01 RX ADMIN — SODIUM CHLORIDE 1 G: 900 INJECTION, SOLUTION INTRAVENOUS at 05:01

## 2018-01-01 RX ADMIN — PROMETHAZINE HYDROCHLORIDE 6.25 MG: 25 INJECTION INTRAMUSCULAR; INTRAVENOUS at 03:01

## 2018-01-01 RX ADMIN — OXYCODONE HYDROCHLORIDE AND ACETAMINOPHEN 500 MG: 500 TABLET ORAL at 08:01

## 2018-01-01 RX ADMIN — HYDRALAZINE HYDROCHLORIDE 10 MG: 10 TABLET ORAL at 08:01

## 2018-01-01 RX ADMIN — FERROUS SULFATE TAB EC 325 MG (65 MG FE EQUIVALENT) 325 MG: 325 (65 FE) TABLET DELAYED RESPONSE at 05:01

## 2018-01-01 RX ADMIN — STANDARDIZED SENNA CONCENTRATE AND DOCUSATE SODIUM 1 TABLET: 8.6; 5 TABLET, FILM COATED ORAL at 08:01

## 2018-01-01 RX ADMIN — DEXTROSE MONOHYDRATE 25 G: 25 INJECTION, SOLUTION INTRAVENOUS at 04:02

## 2018-01-01 RX ADMIN — ENOXAPARIN SODIUM 30 MG: 100 INJECTION SUBCUTANEOUS at 05:01

## 2018-01-01 RX ADMIN — FENTANYL CITRATE 50 MCG: 50 INJECTION INTRAMUSCULAR; INTRAVENOUS at 12:02

## 2018-01-01 RX ADMIN — SODIUM CHLORIDE: 0.9 INJECTION, SOLUTION INTRAVENOUS at 02:02

## 2018-01-01 RX ADMIN — ACETAMINOPHEN 650 MG: 325 TABLET ORAL at 04:01

## 2018-01-01 RX ADMIN — ASPIRIN 81 MG 81 MG: 81 TABLET ORAL at 09:01

## 2018-01-01 RX ADMIN — THERA TABS 1 TABLET: TAB at 09:01

## 2018-01-01 RX ADMIN — FENTANYL CITRATE 25 MCG: 50 INJECTION, SOLUTION INTRAMUSCULAR; INTRAVENOUS at 06:01

## 2018-01-01 RX ADMIN — SODIUM CHLORIDE, SODIUM LACTATE, POTASSIUM CHLORIDE, AND CALCIUM CHLORIDE: .6; .31; .03; .02 INJECTION, SOLUTION INTRAVENOUS at 11:01

## 2018-01-01 RX ADMIN — FERROUS SULFATE TAB EC 325 MG (65 MG FE EQUIVALENT) 325 MG: 325 (65 FE) TABLET DELAYED RESPONSE at 08:01

## 2018-01-01 RX ADMIN — TUBERCULIN PURIFIED PROTEIN DERIVATIVE 5 UNITS: 5 INJECTION INTRADERMAL at 05:01

## 2018-01-01 RX ADMIN — ACETAMINOPHEN 1000 MG: 10 INJECTION, SOLUTION INTRAVENOUS at 01:01

## 2018-01-01 RX ADMIN — PROPOFOL 20 MG: 10 INJECTION, EMULSION INTRAVENOUS at 12:01

## 2018-01-01 RX ADMIN — DEXTROSE MONOHYDRATE 12.5 G: 25 INJECTION, SOLUTION INTRAVENOUS at 12:02

## 2018-01-01 RX ADMIN — EPINEPHRINE 1 MG: 1 INJECTION PARENTERAL at 08:02

## 2018-01-01 RX ADMIN — LACTULOSE 20 G: 20 SOLUTION ORAL at 01:01

## 2018-01-01 RX ADMIN — HYDROCODONE BITARTRATE AND ACETAMINOPHEN 1 TABLET: 5; 325 TABLET ORAL at 09:01

## 2018-01-01 RX ADMIN — VITAMIN D, TAB 1000IU (100/BT) 1000 UNITS: 25 TAB at 09:01

## 2018-01-01 RX ADMIN — DEXTROSE 2 G: 50 INJECTION, SOLUTION INTRAVENOUS at 12:01

## 2018-01-01 RX ADMIN — METOCLOPRAMIDE 10 MG: 5 INJECTION, SOLUTION INTRAMUSCULAR; INTRAVENOUS at 06:02

## 2018-01-01 RX ADMIN — NOREPINEPHRINE BITARTRATE 3 MCG/KG/MIN: 1 INJECTION, SOLUTION, CONCENTRATE INTRAVENOUS at 05:02

## 2018-01-01 RX ADMIN — HYDROCODONE BITARTRATE AND ACETAMINOPHEN 1 TABLET: 10; 325 TABLET ORAL at 01:01

## 2018-01-01 RX ADMIN — GLYCOPYRROLATE 0.6 MG: 0.2 INJECTION, SOLUTION INTRAMUSCULAR; INTRAVENOUS at 01:01

## 2018-01-01 RX ADMIN — CEFEPIME 2 G: 2 INJECTION, POWDER, FOR SOLUTION INTRAVENOUS at 12:02

## 2018-01-01 RX ADMIN — ONDANSETRON 4 MG: 2 INJECTION INTRAMUSCULAR; INTRAVENOUS at 11:01

## 2018-01-01 RX ADMIN — HYDROCODONE BITARTRATE AND ACETAMINOPHEN 1 TABLET: 10; 325 TABLET ORAL at 12:01

## 2018-01-01 RX ADMIN — NITROGLYCERIN 0.4 MG: 0.4 TABLET SUBLINGUAL at 04:01

## 2018-01-01 RX ADMIN — CEFAZOLIN 1 G: 330 INJECTION, POWDER, FOR SOLUTION INTRAMUSCULAR; INTRAVENOUS at 09:01

## 2018-01-01 RX ADMIN — CALCIUM CHLORIDE 1 G: 100 INJECTION, SOLUTION INTRAVENOUS; INTRAVENTRICULAR at 06:02

## 2018-01-01 RX ADMIN — SODIUM CHLORIDE: 0.9 INJECTION, SOLUTION INTRAVENOUS at 09:02

## 2018-01-01 RX ADMIN — SODIUM CHLORIDE: 0.9 INJECTION, SOLUTION INTRAVENOUS at 05:02

## 2018-01-01 RX ADMIN — FENTANYL CITRATE 50 MCG: 50 INJECTION INTRAMUSCULAR; INTRAVENOUS at 03:02

## 2018-01-01 RX ADMIN — SODIUM CHLORIDE 1000 ML: 0.9 INJECTION, SOLUTION INTRAVENOUS at 09:02

## 2018-01-01 RX ADMIN — HYDROCODONE BITARTRATE AND ACETAMINOPHEN 1 TABLET: 5; 325 TABLET ORAL at 07:02

## 2018-01-01 RX ADMIN — NOREPINEPHRINE BITARTRATE 1.4 MCG/KG/MIN: 1 INJECTION, SOLUTION, CONCENTRATE INTRAVENOUS at 11:02

## 2018-01-01 RX ADMIN — SODIUM BICARBONATE: 84 INJECTION, SOLUTION INTRAVENOUS at 02:02

## 2018-01-01 RX ADMIN — DEXTROSE MONOHYDRATE, SODIUM CHLORIDE, AND POTASSIUM CHLORIDE: 50; 4.5; 1.49 INJECTION, SOLUTION INTRAVENOUS at 05:01

## 2018-01-01 RX ADMIN — ALBUTEROL SULFATE 5 MG: 2.5 SOLUTION RESPIRATORY (INHALATION) at 12:02

## 2018-01-01 RX ADMIN — SODIUM CHLORIDE 1000 ML: 0.9 INJECTION, SOLUTION INTRAVENOUS at 12:02

## 2018-01-01 RX ADMIN — HYDROMORPHONE HYDROCHLORIDE 0.2 MG: 2 INJECTION INTRAMUSCULAR; INTRAVENOUS; SUBCUTANEOUS at 03:01

## 2018-01-01 RX ADMIN — VITAMIN D, TAB 1000IU (100/BT) 1000 UNITS: 25 TAB at 08:01

## 2018-01-01 RX ADMIN — PROPOFOL 100 MG: 10 INJECTION, EMULSION INTRAVENOUS at 11:01

## 2018-01-01 RX ADMIN — ALBUMIN HUMAN: 0.05 INJECTION, SOLUTION INTRAVENOUS at 02:02

## 2018-01-01 RX ADMIN — MICAFUNGIN SODIUM 100 MG: 20 INJECTION, POWDER, LYOPHILIZED, FOR SOLUTION INTRAVENOUS at 12:02

## 2018-01-01 RX ADMIN — SODIUM CHLORIDE 2700 ML: 0.9 INJECTION, SOLUTION INTRAVENOUS at 02:02

## 2018-01-01 RX ADMIN — THERA TABS 1 TABLET: TAB at 08:01

## 2018-01-01 RX ADMIN — ACETAMINOPHEN 650 MG: 325 TABLET ORAL at 01:01

## 2018-01-01 RX ADMIN — SODIUM BICARBONATE 50 MEQ: 84 INJECTION, SOLUTION INTRAVENOUS at 04:02

## 2018-01-01 RX ADMIN — SODIUM CHLORIDE 1000 ML: 0.9 INJECTION, SOLUTION INTRAVENOUS at 04:01

## 2018-01-01 RX ADMIN — METRONIDAZOLE 500 MG: 500 INJECTION, SOLUTION INTRAVENOUS at 01:02

## 2018-01-01 RX ADMIN — PANTOPRAZOLE SODIUM 40 MG: 40 TABLET, DELAYED RELEASE ORAL at 09:01

## 2018-01-01 RX ADMIN — HYDROCODONE BITARTRATE AND ACETAMINOPHEN 1 TABLET: 10; 325 TABLET ORAL at 08:01

## 2018-01-01 RX ADMIN — PHENYLEPHRINE HYDROCHLORIDE 200 MCG: 10 INJECTION INTRAVENOUS at 11:01

## 2018-01-01 RX ADMIN — METRONIDAZOLE 500 MG: 500 INJECTION, SOLUTION INTRAVENOUS at 06:02

## 2018-01-01 RX ADMIN — NOREPINEPHRINE BITARTRATE 1 MCG/KG/MIN: 1 INJECTION, SOLUTION, CONCENTRATE INTRAVENOUS at 05:02

## 2018-01-01 RX ADMIN — STANDARDIZED SENNA CONCENTRATE AND DOCUSATE SODIUM 1 TABLET: 8.6; 5 TABLET, FILM COATED ORAL at 09:01

## 2018-01-01 RX ADMIN — HYDROCODONE BITARTRATE AND ACETAMINOPHEN 1 TABLET: 5; 325 TABLET ORAL at 06:01

## 2018-01-01 RX ADMIN — CALCIUM CHLORIDE 500 MG: 100 INJECTION, SOLUTION INTRAVENOUS at 03:02

## 2018-01-01 RX ADMIN — BISACODYL 10 MG: 10 SUPPOSITORY RECTAL at 05:01

## 2018-01-01 RX ADMIN — ONDANSETRON 4 MG: 2 INJECTION, SOLUTION INTRAMUSCULAR; INTRAVENOUS at 01:01

## 2018-01-01 RX ADMIN — DEXTROSE MONOHYDRATE 50 ML: 25 INJECTION, SOLUTION INTRAVENOUS at 06:02

## 2018-01-01 RX ADMIN — HYDROMORPHONE HYDROCHLORIDE 0.5 MG: 2 INJECTION INTRAMUSCULAR; INTRAVENOUS; SUBCUTANEOUS at 11:01

## 2018-01-01 RX ADMIN — FENTANYL CITRATE 25 MCG: 50 INJECTION, SOLUTION INTRAMUSCULAR; INTRAVENOUS at 03:02

## 2018-01-01 RX ADMIN — SODIUM BICARBONATE 125 ML: 84 INJECTION, SOLUTION INTRAVENOUS at 02:02

## 2018-01-01 RX ADMIN — PHENYLEPHRINE HYDROCHLORIDE 200 MCG: 10 INJECTION INTRAVENOUS at 12:01

## 2018-01-01 RX ADMIN — ALBUMIN HUMAN: 0.05 INJECTION, SOLUTION INTRAVENOUS at 03:02

## 2018-01-01 RX ADMIN — CALCIUM GLUCONATE 1000 MG: 94 INJECTION, SOLUTION INTRAVENOUS at 11:02

## 2018-01-01 RX ADMIN — NOREPINEPHRINE BITARTRATE 8000 MCG: 1 INJECTION, SOLUTION, CONCENTRATE INTRAVENOUS at 10:02

## 2018-01-01 RX ADMIN — ACETAMINOPHEN 650 MG: 325 TABLET ORAL at 02:01

## 2018-01-01 RX ADMIN — HYDROCODONE BITARTRATE AND ACETAMINOPHEN 1 TABLET: 10; 325 TABLET ORAL at 05:01

## 2018-01-01 RX ADMIN — FAMOTIDINE 20 MG: 10 INJECTION, SOLUTION INTRAVENOUS at 08:02

## 2018-01-01 RX ADMIN — SODIUM BICARBONATE 50 MEQ: 84 INJECTION, SOLUTION INTRAVENOUS at 08:02

## 2018-01-01 RX ADMIN — PHENYLEPHRINE HYDROCHLORIDE 100 MCG: 10 INJECTION INTRAVENOUS at 12:01

## 2018-01-01 RX ADMIN — METOCLOPRAMIDE 10 MG: 5 INJECTION, SOLUTION INTRAMUSCULAR; INTRAVENOUS at 11:02

## 2018-01-01 RX ADMIN — SODIUM BICARBONATE 50 MEQ: 84 INJECTION, SOLUTION INTRAVENOUS at 06:02

## 2018-01-01 RX ADMIN — HYDROCODONE BITARTRATE AND ACETAMINOPHEN 1 TABLET: 10; 325 TABLET ORAL at 06:01

## 2018-01-01 RX ADMIN — LISINOPRIL 40 MG: 20 TABLET ORAL at 08:01

## 2018-01-01 RX ADMIN — ROCURONIUM BROMIDE 20 MG: 10 INJECTION, SOLUTION INTRAVENOUS at 12:01

## 2018-01-01 RX ADMIN — SODIUM CHLORIDE 1 G: 900 INJECTION, SOLUTION INTRAVENOUS at 01:01

## 2018-01-01 RX ADMIN — FERROUS SULFATE TAB EC 325 MG (65 MG FE EQUIVALENT) 325 MG: 325 (65 FE) TABLET DELAYED RESPONSE at 09:01

## 2018-01-01 RX ADMIN — ASPIRIN 81 MG 81 MG: 81 TABLET ORAL at 08:01

## 2018-01-01 RX ADMIN — SODIUM CHLORIDE, SODIUM LACTATE, POTASSIUM CHLORIDE, AND CALCIUM CHLORIDE 1000 ML: .6; .31; .03; .02 INJECTION, SOLUTION INTRAVENOUS at 05:01

## 2018-01-01 RX ADMIN — SODIUM BICARBONATE 25 MEQ: 84 INJECTION, SOLUTION INTRAVENOUS at 09:02

## 2018-01-01 RX ADMIN — METRONIDAZOLE 500 MG: 500 INJECTION, SOLUTION INTRAVENOUS at 11:02

## 2018-01-01 RX ADMIN — LISINOPRIL 40 MG: 20 TABLET ORAL at 09:01

## 2018-01-01 RX ADMIN — HYDROCODONE BITARTRATE AND ACETAMINOPHEN 1 TABLET: 5; 325 TABLET ORAL at 08:01

## 2018-01-01 RX ADMIN — FUROSEMIDE 20 MG: 20 TABLET ORAL at 08:01

## 2018-01-01 RX ADMIN — HYDRALAZINE HYDROCHLORIDE 10 MG: 10 TABLET ORAL at 09:01

## 2018-01-01 RX ADMIN — PROCHLORPERAZINE EDISYLATE 10 MG: 5 INJECTION INTRAMUSCULAR; INTRAVENOUS at 05:01

## 2018-01-01 RX ADMIN — ACETAMINOPHEN 650 MG: 325 TABLET ORAL at 08:01

## 2018-01-01 RX ADMIN — SODIUM BICARBONATE 50 MEQ: 84 INJECTION, SOLUTION INTRAVENOUS at 03:02

## 2018-01-01 RX ADMIN — FENTANYL CITRATE 100 MCG: 50 INJECTION, SOLUTION INTRAMUSCULAR; INTRAVENOUS at 11:01

## 2018-01-01 RX ADMIN — SUCCINYLCHOLINE CHLORIDE 100 MG: 20 INJECTION, SOLUTION INTRAMUSCULAR; INTRAVENOUS at 11:01

## 2018-01-01 RX ADMIN — SODIUM CHLORIDE: 0.9 INJECTION, SOLUTION INTRAVENOUS at 10:02

## 2018-01-01 RX ADMIN — SODIUM BICARBONATE 125 ML: 84 INJECTION, SOLUTION INTRAVENOUS at 05:02

## 2018-01-01 RX ADMIN — HYDROMORPHONE HYDROCHLORIDE 0.2 MG: 2 INJECTION INTRAMUSCULAR; INTRAVENOUS; SUBCUTANEOUS at 02:01

## 2018-01-01 RX ADMIN — ONDANSETRON 4 MG: 2 INJECTION INTRAMUSCULAR; INTRAVENOUS at 02:01

## 2018-01-01 RX ADMIN — SODIUM CHLORIDE 1000 ML: 0.9 INJECTION, SOLUTION INTRAVENOUS at 10:02

## 2018-01-01 RX ADMIN — SODIUM BICARBONATE 50 MEQ: 84 INJECTION, SOLUTION INTRAVENOUS at 12:02

## 2018-01-01 RX ADMIN — LACTULOSE 20 G: 10 SOLUTION ORAL at 08:01

## 2018-01-01 RX ADMIN — ETOMIDATE 10 MG: 2 INJECTION, SOLUTION INTRAVENOUS at 02:02

## 2018-01-01 RX ADMIN — FUROSEMIDE 40 MG: 40 TABLET ORAL at 09:01

## 2018-01-01 RX ADMIN — FENTANYL CITRATE 50 MCG: 50 INJECTION, SOLUTION INTRAMUSCULAR; INTRAVENOUS at 05:01

## 2018-01-01 RX ADMIN — CEFEPIME 2 G: 2 INJECTION, POWDER, FOR SOLUTION INTRAVENOUS at 02:02

## 2018-01-01 RX ADMIN — FUROSEMIDE 40 MG: 40 TABLET ORAL at 12:01

## 2018-01-01 RX ADMIN — VASOPRESSIN 0.04 UNITS/MIN: 20 INJECTION INTRAVENOUS at 04:02

## 2018-01-01 RX ADMIN — IOHEXOL 100 ML: 350 INJECTION, SOLUTION INTRAVENOUS at 11:02

## 2018-01-01 RX ADMIN — PANTOPRAZOLE SODIUM 40 MG: 40 TABLET, DELAYED RELEASE ORAL at 08:01

## 2018-01-01 RX ADMIN — HEPARIN SODIUM AND DEXTROSE 16 UNITS/KG/HR: 10000; 5 INJECTION INTRAVENOUS at 07:02

## 2018-01-01 RX ADMIN — ENOXAPARIN SODIUM 30 MG: 100 INJECTION SUBCUTANEOUS at 04:01

## 2018-01-01 RX ADMIN — NOREPINEPHRINE BITARTRATE 2 MCG/KG/MIN: 1 INJECTION, SOLUTION, CONCENTRATE INTRAVENOUS at 03:02

## 2018-01-01 RX ADMIN — DEXTROSE MONOHYDRATE 12.5 G: 25 INJECTION, SOLUTION INTRAVENOUS at 11:02

## 2018-01-01 RX ADMIN — NEOSTIGMINE METHYLSULFATE 3 MG: 1 INJECTION INTRAVENOUS at 01:01

## 2018-01-01 RX ADMIN — METRONIDAZOLE 500 MG: 500 INJECTION, SOLUTION INTRAVENOUS at 12:02

## 2018-01-01 RX ADMIN — OXYCODONE HYDROCHLORIDE AND ACETAMINOPHEN 500 MG: 500 TABLET ORAL at 09:01

## 2018-01-01 RX ADMIN — STANDARDIZED SENNA CONCENTRATE AND DOCUSATE SODIUM 1 TABLET: 8.6; 5 TABLET, FILM COATED ORAL at 11:01

## 2018-01-01 RX ADMIN — NOREPINEPHRINE BITARTRATE 1.3 MCG/KG/MIN: 1 INJECTION, SOLUTION, CONCENTRATE INTRAVENOUS at 11:02

## 2018-01-01 RX ADMIN — SODIUM CHLORIDE 1000 ML: 0.9 INJECTION, SOLUTION INTRAVENOUS at 02:01

## 2018-01-01 RX ADMIN — EPINEPHRINE 1 MG: 1 INJECTION PARENTERAL at 06:02

## 2018-01-01 RX ADMIN — ACETAMINOPHEN 650 MG: 325 TABLET ORAL at 02:02

## 2018-01-01 RX ADMIN — DEXAMETHASONE SODIUM PHOSPHATE 4 MG: 4 INJECTION, SOLUTION INTRAMUSCULAR; INTRAVENOUS at 01:01

## 2018-01-01 RX ADMIN — LACTULOSE 20 G: 10 SOLUTION ORAL at 10:01

## 2018-01-01 RX ADMIN — CHLORHEXIDINE GLUCONATE 15 ML: 1.2 RINSE ORAL at 08:02

## 2018-01-01 RX ADMIN — LIDOCAINE HYDROCHLORIDE 80 MG: 20 INJECTION, SOLUTION INTRAVENOUS at 11:01

## 2018-01-01 RX ADMIN — SODIUM BICARBONATE: 84 INJECTION, SOLUTION INTRAVENOUS at 08:02
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
## 2018-01-04 NOTE — ED NOTES
Attempted to cath patient for UA. Insufficient quantity of urine obtained. Will hydrate and attempt again
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
## 2018-01-26 PROBLEM — Z86.711 HISTORY OF PULMONARY EMBOLISM: Chronic | Status: ACTIVE | Noted: 2018-01-01

## 2018-01-26 PROBLEM — N18.30 CKD (CHRONIC KIDNEY DISEASE), STAGE III: Chronic | Status: ACTIVE | Noted: 2017-01-01

## 2018-01-26 PROBLEM — S72.141A INTERTROCHANTERIC FRACTURE OF RIGHT FEMUR: Status: ACTIVE | Noted: 2018-01-01

## 2018-01-26 PROBLEM — S72.001A CLOSED FRACTURE OF RIGHT HIP: Status: ACTIVE | Noted: 2018-01-01
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
## 2018-01-27 PROBLEM — S50.819A ABRASION OF FOREARM: Status: ACTIVE | Noted: 2018-01-01

## 2018-01-27 NOTE — PLAN OF CARE
Problem: Patient Care Overview  Goal: Plan of Care Review  Outcome: Ongoing (interventions implemented as appropriate)  Recovery complete with no issues, vss, nausea decreased, pain tolerable, reported out and contacted family

## 2018-01-27 NOTE — NURSING
ST elevation noted on tele, proper lead placement confirmed, called to request 12 Lead EKG now instead of at 8AM, pt report no pain or discomfort in or near chest area, no sign of distress, family at bedside, will cont to monitor.

## 2018-01-27 NOTE — BRIEF OP NOTE
[facility]  Brief Operative Note    SUMMARY     Surgery Date: 1/27/2018     Surgeon(s) and Role:     * Uziel Lynn Jr., MD - Primary    Assisting Surgeon: None    Pre-op Diagnosis:  Closed fracture of right hip, initial encounter [S72.001A]    Post-op Diagnosis:  Post-Op Diagnosis Codes:     * Closed fracture of right hip, initial encounter [S72.001A]    Procedure(s) (LRB):  OPEN REDUCTION INTERNAL FIXATION-HIP-INTERTROCHANTERIC (Right)    Anesthesia: General    Description of Procedure: right hipn fx    Description of the findings of the procedure: ORIF right hip fx    Estimated Blood Loss: 100 mL         Specimens:   Specimen (12h ago through future)    None

## 2018-01-27 NOTE — ANESTHESIA RELEASE NOTE
Anesthesia Release from PACU Note    Patient: Esha Torres    Procedure(s) Performed: Procedure(s) (LRB):  OPEN REDUCTION INTERNAL FIXATION-HIP-INTERTROCHANTERIC (Right)    Anesthesia type: general    Post pain: Adequate analgesia    Post assessment: no apparent anesthetic complications    Last Vitals:   Visit Vitals  BP (!) 123/58   Pulse 68   Temp 37.1 °C (98.8 °F) (Oral)   Resp 15   Ht 5' (1.524 m)   Wt 89 kg (196 lb 3.2 oz)   LMP  (LMP Unknown)   SpO2 99%   Breastfeeding? No   BMI 38.32 kg/m²       Post vital signs: stable    Level of consciousness: awake    Nausea/Vomiting: no nausea/no vomiting    Complications: none    Airway Patency: patent    Respiratory: unassisted, nasal cannula    Cardiovascular: stable    Hydration: euvolemic
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
## 2018-01-27 NOTE — TRANSFER OF CARE
Anesthesia Transfer of Care Note    Patient: Esha Torres    Procedure(s) Performed: Procedure(s) (LRB):  OPEN REDUCTION INTERNAL FIXATION-HIP-INTERTROCHANTERIC (Right)    Patient location: PACU    Anesthesia Type: general    Transport from OR: Transported from OR on 6-10 L/min O2 by face mask with adequate spontaneous ventilation    Post pain: adequate analgesia    Post assessment: no apparent anesthetic complications and tolerated procedure well    Post vital signs: stable    Level of consciousness: awake, alert and oriented    Nausea/Vomiting: no nausea/vomiting    Complications: none    Transfer of care protocol was followed      Last vitals:   Visit Vitals  BP (!) 140/65 (Patient Position: Lying)   Pulse 60   Temp 37.2 °C (98.9 °F) (Oral)   Resp 18   Ht 5' (1.524 m)   Wt 89 kg (196 lb 3.2 oz)   LMP  (LMP Unknown)   SpO2 96%   Breastfeeding? No   BMI 38.32 kg/m²
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
## 2018-01-27 NOTE — ASSESSMENT & PLAN NOTE
Closed fracture of right hip  Orthopedic surgery consulted, plan for ORIF in a.m.   Continue to monitor.  PT/OT following surgery.

## 2018-01-27 NOTE — NURSING
Pt admitted to room 526 from ER via stretcher with daughter at bedside. Oriented to room, reviewed plan of care with pt and daughter, positioned for comfort and gave pain medication for 7/10 pain right hip.

## 2018-01-27 NOTE — ED NOTES
Pt on stretcher, alert and oriented x 3. Pt complains of pain to right hip and right leg. Family at bedside.
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
## 2018-01-27 NOTE — ASSESSMENT & PLAN NOTE
Mobitz type 1 second degree AV block  Cardiac pacemaker in situ  Pacemaker in place.  Monitor on telemetry.  EKG in a.m.
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
## 2018-01-27 NOTE — PT/OT/SLP PROGRESS
Physical Therapy      Patient Name:  Esha Torres   MRN:  [id number]    PT order for trapeze set up not completed, bed not in room.  Nurse notified of status.  Will follow-up with nursing in am when may have PT Eval orders as well.    Yarelis Moore, PT

## 2018-01-27 NOTE — ASSESSMENT & PLAN NOTE
2D echo 6/16/16 with 55% EF and diastolic dysfunction.  Patient only takes furosemide PRN.  BNP elevated with edema on x-ray so give daily.  Continue lisinopril.
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
## 2018-01-27 NOTE — CONSULTS
REASON FOR CONSULTATION:  Right hip fracture.    HISTORY OF PRESENT ILLNESS:  An 88-year-old female sustained fall last night   after walking with her walker, injured her right hip, unable to ambulate.  No   other injury reported, except for some abrasions on the right hand.    Admitted by Hospitalist Medicine for probable surgery on the right hip.    PAST MEDICAL HISTORY:  Significant for hypertension, hyperlipidemia, obesity,   osteopenia, history of pulmonary embolism.    PAST SURGICAL HISTORY:  Includes appendectomy, breast biopsy, cardiac pacemaker   placement, , cholecystectomy, hysterectomy.    FAMILY HISTORY:  Positive for stomach cancer and uterine cancer.    SOCIAL HISTORY:  The patient does not smoke or drink.  She does live at home and   is very active.    ALLERGIES:  HCTZ.    CURRENT MEDICATIONS:  Reviewed on the chart.    PHYSICAL EXAMINATION:  GENERAL:  Well-developed, well-nourished female, elderly, in no acute distress,   alert and oriented x3.  EXTREMITIES:  Examination of the lower extremities significant for the right   hip, which is tender to touch.  The right lower extremity is shortened and   externally rotated.  She has pain with any movement of the right leg.  She is   able to move the toes, some pedal edema is noted bilaterally in the lower   extremities.    X-RAYS:  AP and lateral of the right hip demonstrated displaced   intertrochanteric hip fracture with extension into the lesser trochanter.    IMPRESSION:  Right hip intertrochanteric hip fracture.    PLAN:  We will plan surgery today for ORIF of right hip fracture.  The risks and   benefits of surgery explained to the patient and family, she was able to sign   the consent without difficulties, she is very alert and oriented.      TANG/IN  dd: 2018 09:40:50 (CST)  td: 2018 10:15:34 (CST)  Doc ID   #[id number]  Job ID #[id number]    CC:
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
## 2018-01-27 NOTE — ED NOTES
APPEARANCE: Alert, oriented and in no acute distress.  CARDIAC: Normal rate and rhythm, no murmur heard.   PERIPHERAL VASCULAR: peripheral pulses present. Normal cap refill. Edema to bilateral lower legs and ankles, daughter states this is chronic. Warm to touch.    RESPIRATORY:Normal rate and effort, breath sounds clear bilaterally throughout chest. Respirations are equal and unlabored no obvious signs of distress.  GASTRO: soft, bowel sounds normal, no tenderness, no abdominal distention.  MUSC: Limitedl ROM to right hip and right leg.Right leg is shortened and rotated  SKIN: Skin is warm and dry, normal skin turgor, mucous membranes moist.  NEURO: 5/5 strength major flexors/extensors bilaterally. Sensory intact to light touch bilaterally. [place] coma scale: eyes open spontaneously-4, oriented & converses-5, obeys commands-6. No neurological abnormalities.   MENTAL STATUS: awake, alert and aware of environment.  EYE: PERRL, both eyes: pupils brisk and reactive to light. Normal size.  ENT: EARS: no obvious drainage. NOSE: no active bleeding.   Pt fell on her right side, right hip pain with shortening and rotation of right leg.   Family at bedside.

## 2018-01-27 NOTE — ANESTHESIA POSTPROCEDURE EVALUATION
Anesthesia Post Evaluation    Patient: Esha Torres    Procedure(s) Performed: Procedure(s) (LRB):  OPEN REDUCTION INTERNAL FIXATION-HIP-INTERTROCHANTERIC (Right)    Final Anesthesia Type: general  Patient location during evaluation: PACU  Patient participation: Yes- Able to Participate  Level of consciousness: awake  Post-procedure vital signs: reviewed and stable  Pain management: adequate  Airway patency: patent  PONV status at discharge: No PONV  Anesthetic complications: no      Cardiovascular status: stable  Respiratory status: unassisted, spontaneous ventilation and nasal cannula  Hydration status: euvolemic  Follow-up needed         Visit Vitals  BP (!) 123/58   Pulse 68   Temp 37.1 °C (98.8 °F) (Oral)   Resp 15   Ht 5' (1.524 m)   Wt 89 kg (196 lb 3.2 oz)   LMP  (LMP Unknown)   SpO2 99%   Breastfeeding? No   BMI 38.32 kg/m²       Pain/Alicia Score: Pain Assessment Performed: Yes (1/27/2018  3:39 PM)  Presence of Pain: complains of pain/discomfort (1/27/2018  3:39 PM)  Pain Rating Prior to Med Admin: 4 (1/27/2018  3:00 PM)  Pain Rating Post Med Admin: 2 (1/27/2018  3:39 PM)  Alicia Score: 8 (1/27/2018  3:39 PM)  Modified Alicia Score: 17 (1/27/2018  3:39 PM)

## 2018-01-27 NOTE — SUBJECTIVE & OBJECTIVE
Interval History: Getting surgery today.    Review of Systems   Constitutional: Negative for chills and fever.   Respiratory: Negative for cough and shortness of breath.      Objective:     Vital Signs (Most Recent):  Temp: 98.9 °F (37.2 °C) (01/27/18 0746)  Pulse: 61 (01/27/18 0800)  Resp: 18 (01/27/18 0746)  BP: (!) 140/65 (01/27/18 0746)  SpO2: 96 % (01/27/18 0800) Vital Signs (24h Range):  Temp:  [97.6 °F (36.4 °C)-98.9 °F (37.2 °C)] 98.9 °F (37.2 °C)  Pulse:  [60-82] 61  Resp:  [18] 18  SpO2:  [94 %-100 %] 96 %  BP: (140-187)/(53-79) 140/65     Weight: 89 kg (196 lb 3.2 oz)  Body mass index is 38.32 kg/m².    Intake/Output Summary (Last 24 hours) at 01/27/18 1035  Last data filed at 01/27/18 0745   Gross per 24 hour   Intake                0 ml   Output              350 ml   Net             -350 ml      Physical Exam   Constitutional: She is oriented to person, place, and time. She appears well-developed. No distress.   Cardiovascular: Normal rate and regular rhythm.    Pulmonary/Chest: Effort normal. No respiratory distress.   Neurological: She is alert and oriented to person, place, and time.   Psychiatric: She has a normal mood and affect.   Nursing note and vitals reviewed.      Significant Labs: All pertinent labs within the past 24 hours have been reviewed.    Significant Imaging: I have reviewed all pertinent imaging results/findings within the past 24 hours.   X-Ray Chest AP Portable 1/26/18: Left chest wall pacer noted. The cardiomediastinal silhouette is prominent, similar to the previous exam, magnified by technique and shallow inspiratory effort.  There is no pleural effusion.  The trachea is midline.  The lungs are symmetrically expanded bilaterally with coarse interstitial attenuation, primarily in a perihilar distribution, there is slight edema versus accentuated by inspiratory effort.  There is bilateral basilar subsegmental atelectasis. No large focal consolidation seen.  There is no  pneumothorax.  The osseous structures are remarkable for degenerative changes.  Postsurgical change overlies the left axilla and right upper quadrant as well as the right breast region.  X-Ray Hip 2 View Right 1/26/18: There is an acute intertrochanteric fracture of the right femur.  Evaluation of the right hemipelvis is limited given patient rotation, consider dedicated pelvic radiograph to exclude right pelvic fracture.  The left hemipelvis and sacroiliac joints appear grossly intact.  There is moderate to large amount stool in the rectum, may reflect constipation/impaction.

## 2018-01-28 PROBLEM — D62 POSTOPERATIVE ANEMIA DUE TO ACUTE BLOOD LOSS: Status: ACTIVE | Noted: 2018-01-01

## 2018-01-28 NOTE — SUBJECTIVE & OBJECTIVE
Interval History: Had no unexpected complications from surgery.  Urine output low with IV fluids but has CHF and furosemide dose is low for her renal function.    Review of Systems   Constitutional: Negative for chills and fever.   Respiratory: Negative for cough and shortness of breath.      Objective:     Vital Signs (Most Recent):  Temp: 98.7 °F (37.1 °C) (01/28/18 0753)  Pulse: 69 (01/28/18 1200)  Resp: 18 (01/28/18 0753)  BP: 127/60 (01/28/18 0753)  SpO2: 99 % (01/28/18 1200) Vital Signs (24h Range):  Temp:  [97 °F (36.1 °C)-99.8 °F (37.7 °C)] 98.7 °F (37.1 °C)  Pulse:  [59-78] 69  Resp:  [13-18] 18  SpO2:  [97 %-100 %] 99 %  BP: (104-164)/(52-77) 127/60     Weight: 89.8 kg (198 lb)  Body mass index is 38.67 kg/m².    Intake/Output Summary (Last 24 hours) at 01/28/18 1407  Last data filed at 01/28/18 1200   Gross per 24 hour   Intake          2058.34 ml   Output              380 ml   Net          1678.34 ml      Physical Exam   Constitutional: She is oriented to person, place, and time. She appears well-developed. No distress.   Cardiovascular: Normal rate and regular rhythm.    Pulmonary/Chest: Effort normal. No respiratory distress.   Neurological: She is alert and oriented to person, place, and time.   Psychiatric: She has a normal mood and affect.   Nursing note and vitals reviewed.      Significant Labs: All pertinent labs within the past 24 hours have been reviewed.    Significant Imaging: I have reviewed all pertinent imaging results/findings within the past 24 hours.   X-Ray Hip 2 or 3 views Right 1/27/18: A modified proximal femoral nails seen across intertrochanteric fracture.  There is adjacent soft tissue swelling.  Air noted within the soft tissues.  The alignment of the fracture is now near anatomic except for the displaced lesser trochanteric fragment.

## 2018-01-28 NOTE — PLAN OF CARE
Problem: Occupational Therapy Goal  Goal: Occupational Therapy Goal  Goals to be met by: 2/28/2018     Patient will increase functional independence with ADLs by performing:    UE Dressing with Stand-by Assistance.  LE Dressing with Minimal Assistance and Moderate Assistance.  Grooming while EOB with Supervision.  Toileting from bedside commode with Minimal Assistance for hygiene and clothing management.   Bathing from  edge of bed with Minimal Assistance.  Toilet transfer to bedside commode with Minimal Assistance.    Has rollator, grab bars, shower chair, & quad cane.  DME & post-acute therapy needs TBD pending progress with OT.     Outcome: Ongoing (interventions implemented as appropriate)  Initial OT eval/treat complete.  To benefit from continued OT services to increase independence in self-care & functional T/F.  OT to follow.

## 2018-01-28 NOTE — PLAN OF CARE
Problem: Physical Therapy Goal  Goal: Physical Therapy Goal  Goals to be met by: 2018     Patient will increase functional independence with mobility by performin. Supine to sit with MInimal Assistance  2. Sit to supine with MInimal Assistance  3. Sit to stand transfer with Minimal Assistance  4. Bed to chair transfer with Minimal Assistance using Rolling Walker  5. Gait  x 75 feet with Minimal Assistance using Rolling Walker.   6. Ascend/Descend 5 inch curb step with Minimal Assistance using Rolling Walker if able  7. Lower extremity exercise program x10 reps with assistance as needed     Outcome: Ongoing (interventions implemented as appropriate)  Patient with c/o no initial pain at rest; increased pain with transitional movements; pt sat at EOB x 15 min with c/o weakness, good vitals; will benefit from acute PT services while in hospital; d/c recs TBD.
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
## 2018-01-28 NOTE — PROGRESS NOTES
POD 1  A little weak with PT Some pain right hip  AF VSS  Right hip- Some drainage but no active bleeding        NVI    XRay- excellent position of right hip fx    HCT= 22%    P: Daily PT       MIght need transfusion but patient would prefer to wait 24 hrs to see how she feels

## 2018-01-28 NOTE — ASSESSMENT & PLAN NOTE
Monitor.  Went over risks of blood transfusion with her so if needed, just need a signature on a consent form.

## 2018-01-28 NOTE — ASSESSMENT & PLAN NOTE
Closed fracture of right hip  S/p ORIF.  PT/OT.  Appreciate Dr. Lynn.  Start enoxaparin for DVT prophylaxis.
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
## 2018-01-28 NOTE — HOSPITAL COURSE
"Dr. Lynn performed ORIF on 1/27/18.  She had postoperative blood loss anemia.  She was put on IV fluids that were stopped the next morning.  Her furosemide dose was increased because it not an effective dose based on her renal function.  Prophylactic enoxaparin was started on 1/28/18.  Physical therapy recommended skilled nursing placement.  Hemoglobin and hematocrit dropped after surgery and on 1/29/18 were 6.9 and 21.3.  Transfused 1 unit on 1/29/17 with Hgb 7.5 and Hct 23. On 1/31 Hgb improved to 7.7. Pt had no bowel movement during admission, lactulose given and soap suds enema to be performed today.   Daughters are concerned about the appropriateness of discharge today, I explained to daughter Esha Cummings that pt would be most appropriately served in a rehab facility where she can focus on therapy as she is no longer "sick" and that once she has had a bowel movement there is no medical reason for her to remain in acute care. Daughter concerned that constipation has a medical cause and I explained that lack of activity and pain medication are the likely and typical causes. Had upper abdominal pain on 1/31/18. Was found to have urinary retention and to have a stool impaction. She had some manual disimpaction performed. Jensen catheter was also placed with improvement in her pain. On 2/1/18, MET was called because she was diaphoretic with pulse ox into the 70s. She was transferred to the ICU for closer observation. Found to have segmental PTE in the LLL and possible Dinora's with impaction/constipation with lactic acidosis.   "

## 2018-01-28 NOTE — PLAN OF CARE
Problem: Patient Care Overview  Goal: Plan of Care Review  Outcome: Ongoing (interventions implemented as appropriate)  Cont to monitor 02 needs

## 2018-01-28 NOTE — PLAN OF CARE
Problem: Patient Care Overview  Goal: Plan of Care Review  Outcome: Ongoing (interventions implemented as appropriate)  PT in NAD. V/s stable. AAOX3. PIV infusing D5 1/2 NS with 20 mEq of K at 100 mL/hr dressing CDI. Scheduled medications given per MAR. Incision to R hip intact small amount of drainage marked. Pt on 2L O2 nasal canula. Pt reports moderate pain PRN meds given. Pt has multiple bruises to L and R forearm from fall at home dressing CDI. Urethral catheter in place drain clear/yellow urine. Encouraged to call for assistance verbalized understanding. Safety maintained bed in low locked position, SR up X2, bed alarm on, and call bell in reach. Will continue to monitor.

## 2018-01-28 NOTE — OP NOTE
DATE OF PROCEDURE:  01/27/2018.    PREOPERATIVE DIAGNOSIS:  Right intertrochanteric hip fracture.    POSTOPERATIVE DIAGNOSIS:  Right intertrochanteric hip fracture.    OPERATIVE PROCEDURE:  Open reduction and internal fixation of right hip fracture   using Synthes intramedullary screw device (TFN nail).    SURGEON:  Uziel Lynn Jr., M.D.    ANESTHESIA:  General endotracheal.    ESTIMATED BLOOD LOSS:  200 mL    COMPLICATIONS:  None.    SPECIMENS:  None.    BRIEF INDICATIONS:  An 88-year-old female sustained injury to her right hip   taken to surgery, ORIF right hip fracture.    OPERATIVE PROCEDURE IN DETAIL:  After operative consent was obtained, the   patient brought to the Operating Room, placed supine on the operating room   table.  Anesthesia by GET method performed by the Anesthesia staff.  After the   patient was asleep, carefully placed on the fracture table, right leg secured in   traction, left leg flexed at the hip and knee and carefully padded.  The right   hip and lower extremity placed in traction.  The C-arm brought in to confirm the   reduction maneuver was successful with slight traction and internal rotation.    The right hip and lower extremity then prepped and draped out in the normal   sterile fashion.  The skin incision made with a 10 blade beginning at the   greater trochanter extending proximally deep dissection used to divide the   subcutaneous tissue and muscular layers, deep retractors were placed.  A finger   was used to palpate the greater trochanter, awl was used to carefully enter the   greater trochanter under C-arm control, a guide wire was then passed through the   awl into the femoral shafts checked the AP and lateral planes.  The reamer was   then used to gently ream up to a size 11 into the shaft.  Following this, a TFN   Synthes nail was carefully inserted over the guidewire, with just hand pressure,   good position achieved and then the outrigger used to make a percutaneous  stab   incision and then drilling into the femoral neck and shaft up into the femoral   head, checked AP and lateral planes followed by a cannulated drill and screw of   95 mm placed up into the femoral head.  Good purchase achieved, the compression   guide used to compress the fracture site under direct visualization and locking   screw placed in through the proximal aspect, the guide wire was removed prior to   doing this, the outrigger was changed for the same incision was used followed   by insertion of the drill followed by insertion of a 38 mm locking screw through   the jay.  Good purchase achieved checked under C-arm control, AP and lateral   planes both incisions irrigated thoroughly with antibiotic saline solution.  The   outrigger removed.  Deep fascia was closed with #1 Vicryl, subcutaneous tissue   closed with 2-0 Vicryl and Steri-Strips on the skin.  Sterile dressing applied   followed by a well-padded dressing.  The patient then carefully taken off the   fracture table, brought to the Recovery Room in stable condition.  All sponge   and needle counts reported as correct.  No complications.      TANG/IN  dd: 01/27/2018 14:10:20 (CST)  td: 01/28/2018 09:21:16 (CST)  Doc ID   #[id number]  Job ID #[id number]    CC:

## 2018-01-28 NOTE — ASSESSMENT & PLAN NOTE
2D echo 6/16/16 with 55% EF and diastolic dysfunction.  Patient only takes furosemide PRN.  BNP elevated with edema on x-ray so give daily at twice her home dose.  Continue lisinopril.
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
## 2018-01-29 NOTE — PLAN OF CARE
Problem: Occupational Therapy Goal  Goal: Occupational Therapy Goal  Goals to be met by: 2/28/2018     Patient will increase functional independence with ADLs by performing:    UE Dressing with Stand-by Assistance.  LE Dressing with Minimal Assistance and Moderate Assistance.  Grooming while EOB with Supervision.  Toileting from bedside commode with Minimal Assistance for hygiene and clothing management.   Bathing from  edge of bed with Minimal Assistance.  Toilet transfer to bedside commode with Minimal Assistance.    Has rollator, grab bars, shower chair, & quad cane.  DME & post-acute therapy needs TBD pending progress with OT.      Outcome: Ongoing (interventions implemented as appropriate)  Pt with slow progression towards goals 2/2 pain and dizziness; low H&H this date. Agreeable to EOB axs. Cont OTPOC. Will recommend SNF At d/c

## 2018-01-29 NOTE — ASSESSMENT & PLAN NOTE
Monitor.  Went over risks of blood transfusion with her so if needed, just need a signature on a consent form.  Daily CBC.
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
## 2018-01-29 NOTE — PLAN OF CARE
Problem: Patient Care Overview  Goal: Plan of Care Review  Outcome: Ongoing (interventions implemented as appropriate)  PT in NAD. V/s stable. AAOX3. Pt reports confusion as to situation reoriented. PIV saline locked dressing CDI. Scheduled medications given per MAR. Incision to R hip intact small amount of drainage marked. Pt on 2L O2 nasal canula. Pt reports moderate pain PRN meds given. Pt has multiple bruises to L and R forearm from fall at home dressing CDI. Urethral catheter in place drain clear/yellow urine. Encouraged to call for assistance verbalized understanding. Safety maintained bed in low locked position, SR up X2, bed alarm on, and call bell in reach. Will continue to monitor.

## 2018-01-29 NOTE — PLAN OF CARE
Chief Complaint   Patient presents with    Hip Pain       fall at home in drive way. denies loc. multiple skin tears. right lexy shortened and rotated     Pt was independent with ADLs, no HH, has S Cane, Q Cane, Shower Chair, Grab Bar on tub, Transport Chair  Pt lives alone but has very supportive daughters, Gricelda Haywood and Esha Torres, who can provide transportation on discharge       01/28/18 2101   Discharge Assessment   Assessment Type Discharge Planning Assessment   Confirmed/corrected address and phone number on facesheet? Yes   Assessment information obtained from? Patient;Caregiver  (pt and daughters: Gricelda Haywood [phone], Esha Torres [phone])   Expected Length of Stay (days) 3   Communicated expected length of stay with patient/caregiver yes   Prior to hospitilization cognitive status: Alert/Oriented   Prior to hospitalization functional status: Independent   Current cognitive status: Alert/Oriented   Current Functional Status: Needs Assistance   Facility Arrived From: (home)   Lives With alone   Able to Return to Prior Arrangements unable to determine at this time (comments)   Is patient able to care for self after discharge? Unable to determine at this time (comments)   Who are your caregiver(s) and their phone number(s)? daughters: Gricelda Haywood [phone], Esha Torres [phone]   Patient's perception of discharge disposition home or selfcare   Readmission Within The Last 30 Days no previous admission in last 30 days   Patient currently being followed by outpatient case management? No   Patient currently receives any other outside agency services? No   Equipment Currently Used at Home cane, straight;cane, quad;shower chair;grab bar;wheelchair  (WC is transport Chair)   Do you have any problems affording any of your prescribed medications? No  (free through [employer] mail order)   Is the patient taking medications as prescribed? yes   Does the patient have  transportation home? Yes  (daughters: Gricelda Haywood [phone], Esha Torres [phone])   Transportation Available family or friend will provide   Dialysis Name and Scheduled days N/A   Does the patient receive services at the [facility]? No   Discharge Plan A Home   Discharge Plan B Home Health   Patient/Family In Agreement With Plan yes     Gricelda Langley, RN Transitional Navigator  [phone]

## 2018-01-29 NOTE — PT/OT/SLP PROGRESS
Occupational Therapy   Treatment    Name: Esha Torres  MRN: [id number]  Admitting Diagnosis:  Intertrochanteric fracture of right femur  2 Days Post-Op    Recommendations:     Discharge Recommendations: nursing facility, skilled  Discharge Equipment Recommendations:  bedside commode, bath bench, walker, rolling, wheelchair  Barriers to discharge: decreased family support     Subjective     Communicated with: nsg prior to session.  Pain/Comfort:  · Pain Rating 1: 10/10  · Location - Side 1: Right  · Location 1: hip  · Pain Addressed 1: Pre-medicate for activity, Distraction, Cessation of Activity, Nurse notified, Reposition  · Pain Rating Post-Intervention 1: 10/10    Patients cultural, spiritual, Orthodoxy conflicts given the current situation: None stated.    Objective:     Patient found with:      General Precautions: Standard, fall   Orthopedic Precautions:RLE weight bearing as tolerated   Braces: N/A     Occupational Performance:    Bed Mobility:    · Patient completed Rolling/Turning to Left with  maximal assistance  · Patient completed Rolling/Turning to Right with maximal assistance  · Patient completed Scooting/Bridging with maximal assistance  · Patient completed Supine to Sit with maximal assistance and 2 persons  · Patient completed Sit to Supine with maximal assistance and 2 persons     Functional Mobility/Transfers:  · Unable to perform     Activities of Daily Living:  · UB Dressing: maximal assistance    · LB Dressing: total assistance      Patient left supine with all lines intact, call button in reach, bed alarm on, nsg notified and dghtr  present    [facility] 6 Click:  [facility] Total Score: 13    Treatment & Education:  Pt performing skills as listed above; increased time for encouragement for participation; pt and dghtr education on risks vs. Benefits of EOB axs; educated on and demonstrated BUE ROM shoulder press/chest press to aid in edema management and strengthening while supine. Remains max a- max a  of 2 for axs 2/2 pain and dizziness; Seated EOB ~ 10 min of session. Participated in LB ROM. Multiple trials of rolling for placement/adjustment of linens.   Education:    Assessment:     Esha Torres is a 88 y.o. female with a medical diagnosis of Intertrochanteric fracture of right femur.  She presents with s/p R ORIF hip.  Performance deficits affecting function are weakness, gait instability, decreased upper extremity function, decreased lower extremity function, impaired balance, impaired endurance, decreased ROM, impaired self care skills, impaired functional mobilty, edema, impaired cognition, pain, orthopedic precautions, impaired skin, decreased safety awareness.      Rehab Prognosis:  Good ; patient would benefit from acute skilled OT services to address these deficits and reach maximum level of function.       Plan:     Patient to be seen 5 x/week to address the above listed problems via self-care/home management, therapeutic exercises, therapeutic activities  · Plan of Care Expires: 02/28/18  · Plan of Care Reviewed with: patient, daughter    This Plan of care has been discussed with the patient who was involved in its development and understands and is in agreement with the identified goals and treatment plan    GOALS:    Occupational Therapy Goals        Problem: Occupational Therapy Goal    Goal Priority Disciplines Outcome Interventions   Occupational Therapy Goal     OT, PT/OT Ongoing (interventions implemented as appropriate)    Description:  Goals to be met by: 2/28/2018     Patient will increase functional independence with ADLs by performing:    UE Dressing with Stand-by Assistance.  LE Dressing with Minimal Assistance and Moderate Assistance.  Grooming while EOB with Supervision.  Toileting from bedside commode with Minimal Assistance for hygiene and clothing management.   Bathing from  edge of bed with Minimal Assistance.  Toilet transfer to bedside commode with Minimal Assistance.    Has  rollator, grab bars, shower chair, & quad cane.  DME & post-acute therapy needs TBD pending progress with OT.                       Time Tracking:     OT Date of Treatment: 01/29/18  OT Start Time: 0946  OT Stop Time: 1014  OT Total Time (min): 28 min    Billable Minutes:Therapeutic Activity 14 co treatment with PT     Jennifer Sorto, OT  1/29/2018

## 2018-01-29 NOTE — PT/OT/SLP PROGRESS
Physical Therapy Treatment    Patient Name:  Esha Torres   MRN:  [id number]    Recommendations:     Discharge Recommendations:  nursing facility, skilled   Discharge Equipment Recommendations: walker, rolling   Barriers to discharge: Inaccessible home and Decreased caregiver support    Assessment:     Esha Torres is a 88 y.o. female admitted with a medical diagnosis of Intertrochanteric fracture of right femur. She presents with the following impairments/functional limitations:  weakness, impaired endurance, impaired self care skills, impaired functional mobilty, gait instability, impaired balance, decreased lower extremity function, pain, decreased ROM, edema. Pt did not tolerate standing activities today 2/2 pain but sat EOB 10 minutes with SBA while completing LE exercises. Pt was living alone PTA, recommending SNF placement upon d/c.    Rehab Prognosis:  Good; patient would benefit from acute skilled PT services to address these deficits and reach maximum level of function.      Recent Surgery: Procedure(s) (LRB):  OPEN REDUCTION INTERNAL FIXATION-HIP-INTERTROCHANTERIC (Right) 2 Days Post-Op    Plan:     During this hospitalization, patient to be seen BID (BID M-F; daily Sat/Sun) to address the above listed problems via gait training, therapeutic activities, therapeutic exercises  · Plan of Care Expires:  02/28/18   Plan of Care Reviewed with: patient, daughter    Subjective     Communicated with WILSON Pham prior to session.  Patient found supine with HOB elevated upon PT entry to room, agreeable to treatment.      Chief Complaint: R hip pain  Patient comments/goals: Pt reports not wanting to get up today 2/2 R hip pain, pt was agreeable to participate in session with motivation provided by PT/OT and pt's daughter and educated on importance of mobility  During PM session, pt's daughter reported pt refused blood transfusion again and was upset as she believes this is how the pt will start to get better and be  able to participate in sessions.  Pain/Comfort:  · Pain Rating 1: 10/10  · Location - Side 1: Right  · Location 1: hip  · Pain Addressed 1: Pre-medicate for activity, Reposition, Distraction, Cessation of Activity, Nurse notified  · Pain Rating Post-Intervention 1: 10/10    Patients cultural, spiritual, Yazidi conflicts given the current situation: none    Objective:     Patient found with: telemetry, SCD     General Precautions: Standard, fall   Orthopedic Precautions:RLE weight bearing as tolerated   Braces: N/A     Functional Mobility:  · Bed Mobility:     · Rolling Left:  maximal assistance  · Rolling Right: maximal assistance  · Scooting: dependence and of 2 persons for draw sheet transfer up towards HOB  · Supine to Sit: maximal assistance and of 2 persons  · Sit to Supine: maximal assistance and of 2 persons    AM-PAC 6 CLICK MOBILITY  Turning over in bed (including adjusting bedclothes, sheets and blankets)?: 2  Sitting down on and standing up from a chair with arms (e.g., wheelchair, bedside commode, etc.): 1  Moving from lying on back to sitting on the side of the bed?: 2  Moving to and from a bed to a chair (including a wheelchair)?: 1  Need to walk in hospital room?: 1  Climbing 3-5 steps with a railing?: 1  Total Score: 8     Therapeutic Activities and Exercises:  AM session: Pt instructed in sitting EOB ~10 minutes during AM session with vitals assessed. BP supine with HOB elevated: 125/59 mmHg, HR 94 bpm. Sitting EOB: 104/53 mmHg,  bpm. Sitting EOB 5 minutes: 114/56 mmHg and HR 98 bpm. Pt completed LLE LAQs 3 reps and APs x 5 reps with max cues and motivation.   PM session: pt with low energy and activity tolerance today but able to participate in supine exercises. Pt instructed in BLE supine exercises x 5 reps: APs, heel slides, hip abduction slides, QS, and GS. Pt required AAROM for RLE heel slides and hip abduction slides. Pt required increased time and rest breaks to complete exercises.  Pt on RA and SaO2 was 91-93% during exercises. Educated pt and pt's daughter for pt to complete exercises 2-3x/day within tolerance and to complete APs throughout the day. Pt with quiet mood and appears upset, reporting she feels she may have urinated in bed (RN and PCT notified).    Patient left HOB elevated with all lines intact, call button in reach, bed alarm on, RN notified and pt's daughter present.    GOALS:    Physical Therapy Goals        Problem: Physical Therapy Goal    Goal Priority Disciplines Outcome Goal Variances Interventions   Physical Therapy Goal     PT/OT, PT Ongoing (interventions implemented as appropriate)     Description:  Goals to be met by: 2018     Patient will increase functional independence with mobility by performin. Supine to sit with MInimal Assistance  2. Sit to supine with MInimal Assistance  3. Sit to stand transfer with Minimal Assistance  4. Bed to chair transfer with Minimal Assistance using Rolling Walker  5. Gait  x 75 feet with Minimal Assistance using Rolling Walker.   6. Ascend/Descend 5 inch curb step with Minimal Assistance using Rolling Walker if able  7. Lower extremity exercise program x10 reps with assistance as needed                      Time Tracking:     PT Received On: 18  PT Start Time: 0946     PM Start time: 1523  PT Stop Time: 1014      PM Stop time: 1547  PT Total Time (min): 52 min (AM session with OT)    Billable Minutes: Therapeutic Activity 14 and Therapeutic Exercise 24    Treatment Type: Treatment  PT/PTA: PT     PTA Visit Number: 0     Sheron Tamez, PT  2018

## 2018-01-29 NOTE — PLAN OF CARE
Problem: Physical Therapy Goal  Goal: Physical Therapy Goal  Goals to be met by: 2018     Patient will increase functional independence with mobility by performin. Supine to sit with MInimal Assistance  2. Sit to supine with MInimal Assistance  3. Sit to stand transfer with Minimal Assistance  4. Bed to chair transfer with Minimal Assistance using Rolling Walker  5. Gait  x 75 feet with Minimal Assistance using Rolling Walker.   6. Ascend/Descend 5 inch curb step with Minimal Assistance using Rolling Walker if able  7. Lower extremity exercise program x10 reps with assistance as needed     Outcome: Ongoing (interventions implemented as appropriate)  Pt required max A x 2 for supine<>sit and sat EOB 10 minutes with SBA. BP supine: 125/59 mmHg, BP immediately in sittin/53 mmHg, BP in sitting after 5 minutes: 114/56 mmHg. Pt does not tolerate standing this date 2/2 R hip pain and pt feeling dizzy/lightheaded. Recommending SNF placement upon d/c.

## 2018-01-29 NOTE — ASSESSMENT & PLAN NOTE
Mobitz type 1 second degree AV block  Cardiac pacemaker in situ  Pacemaker in place.  Monitor on telemetry.

## 2018-01-29 NOTE — PROGRESS NOTES
made phone contact with long-term care services and completed a level of care eligibility tool (LOCET) for possible skilled nursing facility (SNF) placement.       faxed level one pasrr screen and determination form to [facility] for nursing facility admission approval.

## 2018-01-29 NOTE — SUBJECTIVE & OBJECTIVE
"Interval History: NAEON.  Patient reports pain controlled with current regimen.  She does not want khanna removed at current time; educated patient on increased risk of infection and urinary retention and that khanna removal was indicated to help start getting her back to "normal"; she agreed with removal.    Review of Systems   Constitutional: Negative for chills and fever.   Respiratory: Negative for cough, chest tightness and shortness of breath.    Cardiovascular: Positive for leg swelling. Negative for chest pain and palpitations.   Gastrointestinal: Negative for abdominal pain, nausea and vomiting.   Genitourinary:        Khanna catheter     Objective:     Vital Signs (Most Recent):  Temp: 99.1 °F (37.3 °C) (01/29/18 0518)  Pulse: 100 (01/29/18 0518)  Resp: 18 (01/29/18 0518)  BP: 131/60 (01/29/18 0518)  SpO2: 97 % (01/28/18 2308) Vital Signs (24h Range):  Temp:  [97.5 °F (36.4 °C)-99.6 °F (37.6 °C)] 99.1 °F (37.3 °C)  Pulse:  [] 100  Resp:  [18-20] 18  SpO2:  [97 %-100 %] 97 %  BP: (112-131)/(53-60) 131/60     Weight: 89.8 kg (198 lb)  Body mass index is 38.67 kg/m².    Intake/Output Summary (Last 24 hours) at 01/29/18 0622  Last data filed at 01/29/18 0013   Gross per 24 hour   Intake           651.67 ml   Output              450 ml   Net           201.67 ml      Physical Exam   Constitutional: She appears well-developed and well-nourished. No distress.   Eyes:   glasses   Cardiovascular: Normal rate, regular rhythm and intact distal pulses.    Pulmonary/Chest: Effort normal and breath sounds normal. No respiratory distress. She has no wheezes.   Abdominal: Soft. Bowel sounds are normal. She exhibits no distension. There is no tenderness.   Musculoskeletal: She exhibits edema.        Right hip: She exhibits decreased range of motion and tenderness.   2+ edema to bilateral upper extremities. 1+ edema to bilateral lower extremities.   Skin:   Dressing to right hip clean, dry, and intact.   Nursing note and " vitals reviewed.      Significant Labs:   BMP: No results for input(s): GLU, NA, K, CL, CO2, BUN, CREATININE, CALCIUM, MG in the last 48 hours.  CBC:   Recent Labs  Lab 01/28/18  0524   WBC 10.41   HGB 7.1*   HCT 22.2*        All pertinent labs within the past 24 hours have been reviewed.    Significant Imaging: I have reviewed all pertinent imaging results/findings within the past 24 hours.     2D echo with color flow doppler 1/28/18: results pending

## 2018-01-29 NOTE — ASSESSMENT & PLAN NOTE
2D echo 6/16/16 with 55% EF and diastolic dysfunction.  Patient only takes furosemide PRN.  BNP elevated with edema on x-ray so give daily at twice her home dose.  Continue lisinopril.  2D echo results pending.

## 2018-01-30 PROBLEM — N17.9 AKI (ACUTE KIDNEY INJURY): Status: ACTIVE | Noted: 2018-01-01

## 2018-01-30 NOTE — PROGRESS NOTES
POD 3  Doing well after transfusion last night. Feels better  AF VSS    Right hip- Incision healing well. Steris in place.             No drainage except for dried blood           HCT= 23    P: Cont PT       SNF planning

## 2018-01-30 NOTE — ASSESSMENT & PLAN NOTE
Closed fracture of right hip  S/p ORIF on 1/27/2018, POD #3.  PT/OT recommend SNF.  Seen today by Dr. Lynn who removed post-op bandage with instruction to leave steri-strips in place and leave open to air.  Continue conservative pain mgmt with bowel regimen. Pt states PCP told her NOT to use miralax so discontinuing that.   Continue enoxaparin for DVT prophylaxis through 2/27/18.

## 2018-01-30 NOTE — PROGRESS NOTES
TN met with patient and daughter Gricelda.  Patient and family agreeable to SNF. Interested in [facility]. Daughter states patient is not ready for transfer and will review SNF list provided for back up choices.      Future Appointments  Date Time Provider Department Center   2/12/2018 8:00 AM HOME MONITOR DEVICE CHECK, [facility]

## 2018-01-30 NOTE — PT/OT/SLP PROGRESS
Occupational Therapy   Treatment    Name: Esha Torres  MRN: [id number]  Admitting Diagnosis:  Intertrochanteric fracture of right femur  3 Days Post-Op    Recommendations:     Discharge Recommendations: nursing facility, skilled  Discharge Equipment Recommendations:   (per SNF)  Barriers to discharge:  Decreased caregiver support, Inaccessible home environment    Subjective     Communicated with: nsg prior to session.  Pain/Comfort:  · Pain Rating 1: 0/10 (at rest )  · Pain Addressed 1: Reposition, Distraction, Nurse notified  · Pain Rating Post-Intervention 1: 0/10 (at rest )    Patients cultural, spiritual, Moravian conflicts given the current situation: None stated.    Objective:     Patient found with:      General Precautions: Standard, fall   Orthopedic Precautions:RLE weight bearing as tolerated   Braces: N/A     Occupational Performance:    Bed Mobility:    · Patient completed Scooting/Bridging with maximal assistance  · Patient completed Supine to Sit with maximal assistance and 2 persons  · Patient completed Sit to Supine with maximal assistance and 2 persons     Functional Mobility/Transfers:  · Patient completed Sit <> Stand Transfer with moderate assistance and of 2 persons  with  rolling walker   · Functional Mobility: Max A of 2 to attempt lateral steps to HOB; limited foot clearance; poor ability to WB through RLE to advance LLE     Activities of Daily Living:  · UB Dressing: minimum assistance    · LB Dressing: total assistance      Patient left supine with all lines intact, call button in reach, bed alarm on, nsg notified and dghtr present    [facility] 6 Click:  [facility] Total Score: 13    Treatment & Education:  Pt performing skills as listed above; increased time required for all axs 2/2 pain; Increased abilities for participation in bed mobility, however, remains max A of 2 to come to sit EOB; BLE therex performed while seated; 2 functional standing trials performed from EOB mod A of 2. Static  standing performed ~1-2 min each trial with Mod A/Min A for balance; Max A of 2 for attempts to laterally step to HOB   Education:    Assessment:     Esha Torres is a 88 y.o. female with a medical diagnosis of Intertrochanteric fracture of right femur.  She presents with R hip ORIF.  Performance deficits affecting function are weakness, decreased upper extremity function, gait instability, impaired balance, impaired endurance, decreased lower extremity function, impaired self care skills, impaired functional mobilty, pain, impaired cognition, decreased safety awareness, edema, impaired skin, decreased ROM.      Rehab Prognosis:  Good ; patient would benefit from acute skilled OT services to address these deficits and reach maximum level of function.       Plan:     Patient to be seen 5 x/week to address the above listed problems via self-care/home management, therapeutic activities, therapeutic exercises  · Plan of Care Expires: 02/28/18  · Plan of Care Reviewed with: patient    This Plan of care has been discussed with the patient who was involved in its development and understands and is in agreement with the identified goals and treatment plan    GOALS:    Occupational Therapy Goals        Problem: Occupational Therapy Goal    Goal Priority Disciplines Outcome Interventions   Occupational Therapy Goal     OT, PT/OT Ongoing (interventions implemented as appropriate)    Description:  Goals to be met by: 2/28/2018     Patient will increase functional independence with ADLs by performing:    UE Dressing with Stand-by Assistance.  LE Dressing with Minimal Assistance and Moderate Assistance.  Grooming while EOB with Supervision.  Toileting from bedside commode with Minimal Assistance for hygiene and clothing management.   Bathing from  edge of bed with Minimal Assistance.  Toilet transfer to bedside commode with Minimal Assistance.    Has rollator, grab bars, shower chair, & quad cane.  DME & post-acute therapy  needs TBD pending progress with OT.                       Time Tracking:     OT Date of Treatment: 01/30/18  OT Start Time: 1000  OT Stop Time: 1038  OT Total Time (min): 38 min    Billable Minutes:Therapeutic Activity 23 co treatment with PT     Jennifer Sorto, OT  1/30/2018

## 2018-01-30 NOTE — PROGRESS NOTES
Call received from Daughter Gricelda, [phone]. Daughter upset and not agreeable with patient discharging today to SNF. TN  informed daughter,  team will round soon and update patient. Patient possibly ready for transfer later today Daughter states she is able to sign admission paperwork today, but does not feel mother is ready to transfer today. Concerns voiced and charge nurse updated aswell.    Dr Del Castillo updated and team will round on patient this afternoon.

## 2018-01-30 NOTE — ASSESSMENT & PLAN NOTE
Mobitz type 1 second degree AV block  Cardiac pacemaker in situ  HR 64-99.  Pacemaker in place.  Monitor on telemetry.

## 2018-01-30 NOTE — PLAN OF CARE
Problem: Patient Care Overview  Goal: Plan of Care Review  Outcome: Ongoing (interventions implemented as appropriate)  The patient's pain has been controlled with oral medication PT worked with pt today. She is a little weak when sitting on bed . Dr Rosas spoke at length to pt re transfusion and pt initially refused Family spoke with patient and she just consented. Turned q two hrs and she assisted. SCD maintained as well safety precautions . Pt education started on medication and activity POC reviewed with pt and daughter Verbalized understanding

## 2018-01-30 NOTE — PROGRESS NOTES
POD 2  Doing well but still weak  AF VSS  Right hip: Dry dressing no active bleeding    HCT= 21.3    P: Would consider transfusion for postop anemia if patient will agree       Cont PT

## 2018-01-30 NOTE — PROGRESS NOTES
met with daughter, Esha Cummings at bedside and informed her according to report given by Dr. Del Castillo patient might be ready for discharge today.  Admission paper work will need to be completed at [facility] prior to discharge.  Esha Cummings reported that her sister, Gricelda will be handling admission paper work at [facility].
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
## 2018-01-30 NOTE — PLAN OF CARE
Problem: Physical Therapy Goal  Goal: Physical Therapy Goal  Goals to be met by: 2018     Patient will increase functional independence with mobility by performin. Supine to sit with MInimal Assistance  2. Sit to supine with MInimal Assistance  3. Sit to stand transfer with Minimal Assistance  4. Bed to chair transfer with Minimal Assistance using Rolling Walker  5. Gait  x 75 feet with Minimal Assistance using Rolling Walker.   6. Ascend/Descend 5 inch curb step with Minimal Assistance using Rolling Walker if able  7. Lower extremity exercise program x10 reps with assistance as needed     Outcome: Ongoing (interventions implemented as appropriate)  Pt with improved activity tolerance, tolerating 2 stands with mod A x 2 from elevated bed to RW and stood with min A for ~2 minutes each bout. Note to follow, recommending SNF placement upon d/c.

## 2018-01-30 NOTE — ASSESSMENT & PLAN NOTE
Was on no medications for osteoporosis at home before admission.  Continue multivitamin and vitamin D supplement.

## 2018-01-30 NOTE — PROGRESS NOTES
made phone contact with [facility] and informed her patient is not ready today.  Aylin reported she spoke with daughter, Gricelda and she is scheduled to complete admission paper work tomorrow, 01/31/18 at 9:30am.

## 2018-01-30 NOTE — PROGRESS NOTES
Patient accepted for admission to [facility] by admit coordinator, Aylin.  [facility] is the family's first preference for skilled nursing facility (SNF) placement. Aylin reported she has submitted a request to [facility] for an authorization.  Also, she has made phone contact with daughter, Esha Cummings to arrange a time for her to complete admit paper work.  Daughter informed Aylin patient is not ready for discharge today and probably will not be ready until Thursday.   told Aylin she will inform the daughter that patient might be ready for discharge today and admission paper work should be completed.     attempted to speak with daughter, Esha Cummings at bedside.  Physical therapy was at bedside.   will attempt to speak with daughter later.

## 2018-01-30 NOTE — ASSESSMENT & PLAN NOTE
Appears baseline hgb is ~ 11. 10.7 on admission. 6.9 before transfusion of 1 unit of PRBC on 1/29/2018 with increase to 7.5.  No visible, active bleeding. Monitor.

## 2018-01-30 NOTE — PROGRESS NOTES
Per daughter, Esha Cummings request referral to [facility] cancelled.

## 2018-01-30 NOTE — ASSESSMENT & PLAN NOTE
No evidence of volume overload. 2D Echo 1/28/18 notes normal systolic function and no diastolic dysfunction.  Stop scheduled furosemide. Monitor I&O and daily weights.

## 2018-01-30 NOTE — SUBJECTIVE & OBJECTIVE
Interval History: Pt sleeping throughout exam having just received pain medicine. Daughter, Esha Cummings, at bedside. I spent > 20 minutes discussing her mother's case and the expected plan for discharge, probably tomorrow. She needs to have a BM and repeat labs in the morning to determine whether medically ready for discharge.  Emphasized that she will NOT be back to her baseline as that is what is done at the facility. Contacted Dr. Lynn to check and change her post-op bandage which he did just after I left the room.      Review of Systems   Unable to perform ROS: Acuity of condition   Pt sleep after receiving pain medicine.     Objective:     Vital Signs (Most Recent):  Temp: 97.8 °F (36.6 °C) (01/30/18 1200)  Pulse: 67 (01/30/18 1200)  Resp: 15 (01/30/18 1200)  BP: (!) 145/67 (01/30/18 1200)  SpO2: (!) 94 % (01/30/18 1131) Vital Signs (24h Range):  Temp:  [97.8 °F (36.6 °C)-99.1 °F (37.3 °C)] 97.8 °F (36.6 °C)  Pulse:  [64-99] 67  Resp:  [14-22] 15  SpO2:  [92 %-96 %] 94 %  BP: (135-191)/(63-79) 145/67     Weight: 89.8 kg (198 lb)  Body mass index is 38.67 kg/m².    Intake/Output Summary (Last 24 hours) at 01/30/18 1506  Last data filed at 01/30/18 1100   Gross per 24 hour   Intake              470 ml   Output             1817 ml   Net            -1347 ml      Physical Exam   Constitutional: No distress.   Cardiovascular: Normal rate and regular rhythm.    Pulmonary/Chest: Effort normal and breath sounds normal. No respiratory distress. She has no wheezes. She has no rales.   Abdominal: Soft. There is no tenderness.   Musculoskeletal: She exhibits edema.   1+ edema in BLE   Neurological:   Sleeping soundly after receiving pain medication   Skin: Skin is warm and dry.   Bandage on right hip clean and dry at present. Bandage on right forearm clean and dry at present.    Nursing note and vitals reviewed.      Significant Labs:   CBC:   Recent Labs  Lab 01/29/18  0428 01/30/18  0422   WBC 10.38 10.56   HGB 6.9* 7.5*    HCT 21.3* 23.0*    216       Significant Imaging: no new

## 2018-01-30 NOTE — ASSESSMENT & PLAN NOTE
ESPERANZA  Appears baseline creatinine 1.1.  Was 1.7 yesterday with elevated BUN likely 2/2 over-diuresis from increased furosemide dose.  Pt uses only as needed at home. Leg edema is likely chronic.  Avoid nephrotoxins. Monitor.

## 2018-01-30 NOTE — ASSESSMENT & PLAN NOTE
-191.  Holding home lisinopril for ESPERANZA.  Starting scheduled hydralazine.  Monitor. PRN clonidine.

## 2018-01-30 NOTE — PT/OT/SLP PROGRESS
"Physical Therapy Treatment    Patient Name:  Esha Torres   MRN:  [id number]    Recommendations:     Discharge Recommendations:  nursing facility, skilled   Discharge Equipment Recommendations:  (defer to SNF)   Barriers to discharge: Inaccessible home and Decreased caregiver support    Assessment:     Esha Torres is a 88 y.o. female admitted with a medical diagnosis of Intertrochanteric fracture of right femur.  She presents with the following impairments/functional limitations:  weakness, impaired endurance, impaired self care skills, impaired functional mobilty, gait instability, impaired balance, decreased lower extremity function, decreased upper extremity function, pain, decreased ROM, impaired skin, edema. Pt with improved activity tolerance today, tolerating 2 stands from slightly elevated hospital bed with mod A x 2 then stood ~2 minutes each bout with min A. Pt requires increased time and motivation to participate in activity and attempt standing.    Rehab Prognosis: Good; patient would benefit from acute skilled PT services to address these deficits and reach maximum level of function.      Recent Surgery: Procedure(s) (LRB):  OPEN REDUCTION INTERNAL FIXATION-HIP-INTERTROCHANTERIC (Right) 3 Days Post-Op    Plan:     During this hospitalization, patient to be seen BID (BID M-F, daily Sat/Sun) to address the above listed problems via gait training, therapeutic activities, therapeutic exercises  · Plan of Care Expires:  02/28/18   Plan of Care Reviewed with: patient, daughter    Subjective     Communicated with WILSON Paez prior to session.  Patient found sleeping in supine with HOB elevated upon PT entry to room, agreeable to treatment.      Chief Complaint: pt with limited speaking with therapists and nods head "no" when speaking about standing and preparing pt to stand but able to complete 2 stands with max motivation and increased time  Patient comments/goals: Pt nodding "no" when speaking of standing " "and requires motivation from PT/OT and pt's daughter to participate in standing  Pain/Comfort:  · Pain Rating 1: 0/10 (no pain at rest adn reports R hip pain and pain "all over" when sitting and with activity)  · Pain Rating Post-Intervention 1: 0/10    Patients cultural, spiritual, Zoroastrian conflicts given the current situation: none    Objective:     Patient found with: telemetry, SCD, bed alarm     General Precautions: Standard, fall   Orthopedic Precautions:RLE weight bearing as tolerated   Braces: N/A     Functional Mobility:  · Bed Mobility:     · Scooting: dependence and of 2 persons  · Supine to Sit: maximal assistance and of 2 persons  · Sit to Supine: maximal assistance and of 2 persons  · Transfers:     · Sit to Stand:  moderate assistance and of 2 persons with rolling walker  · Gait: 2 steps left with RW and pt required OT to move LLE and pt able to slide RLE minimally       AM-PAC 6 CLICK MOBILITY  Turning over in bed (including adjusting bedclothes, sheets and blankets)?: 2  Sitting down on and standing up from a chair with arms (e.g., wheelchair, bedside commode, etc.): 2  Moving from lying on back to sitting on the side of the bed?: 2  Moving to and from a bed to a chair (including a wheelchair)?: 1  Need to walk in hospital room?: 1  Climbing 3-5 steps with a railing?: 1  Total Score: 9       Therapeutic Activities and Exercises:  Pt required significantly increased time for supine>sit with max cues for sequencing as pt able to slide LLE towards EOB but requires assist at RLE and for remainder of motion to bring LLE off bed and assist from OT at trunk.  Pt sat EOB ~20 minutes with SBA, completing APs and LAQs x 5 reps each LE with AAROM on LLE as pt initiating through first ~1/2 of ROM. Pt requires max motivation from PT/OT and daughter to attempt standing as pt just nodding head "no" when speaking of standing. Pt completed 2 stands with mod A x 2 from slightly elevated bed and stood ~2 minutes " each trial with hips and trunk remaining flexed but pt in upright posture with only min A. Pt completed 2 side steps left towards HOB with mod A x 2 with ability to minimally slide RLE to the left and requires assist from OT to move LLE.    Patient left HOB elevated with call button in reach, bed alarm on and RN notified with pt's daugther at bedside.    GOALS:    Physical Therapy Goals        Problem: Physical Therapy Goal    Goal Priority Disciplines Outcome Goal Variances Interventions   Physical Therapy Goal     PT/OT, PT Ongoing (interventions implemented as appropriate)     Description:  Goals to be met by: 2018     Patient will increase functional independence with mobility by performin. Supine to sit with MInimal Assistance  2. Sit to supine with MInimal Assistance  3. Sit to stand transfer with Minimal Assistance  4. Bed to chair transfer with Minimal Assistance using Rolling Walker  5. Gait  x 75 feet with Minimal Assistance using Rolling Walker.   6. Ascend/Descend 5 inch curb step with Minimal Assistance using Rolling Walker if able  7. Lower extremity exercise program x10 reps with assistance as needed                      Time Tracking:     PT Received On: 18  PT Start Time: 959     PT Stop Time: 1037  PT Total Time (min): 38 min     Billable Minutes: Therapeutic Activity 15    Treatment Type: Treatment  PT/PTA: PT     PTA Visit Number: 0     Sheron Tamez, PT  2018

## 2018-01-31 PROBLEM — K59.00 CONSTIPATION: Status: ACTIVE | Noted: 2018-01-01

## 2018-01-31 NOTE — PROGRESS NOTES
Physical Therapy  Missed Visit    Patient Name:  Esha Torres   MRN:  [id number]    Patient not seen today secondary to pt with nursing needs during 0857 attempt as pt has not had a bowel movement recently and pt still has not had bowel movement during 1400 attempt: pt is uncomfortable from constipation and requesting to hold therapy at this time, RN present and administering medication. Will follow-up as able.    Sheron Tamez, PT   1/31/2018

## 2018-01-31 NOTE — PROGRESS NOTES
Patient's family at bedside voiced some concerns.  Charge RN, Jessica updated of above, message also left with Patient relations.     Update:    DINA Hanna.  at patient's bedside.

## 2018-01-31 NOTE — TELEPHONE ENCOUNTER
I spoke with the patients daughter and she was upset stating that [facility] is trying to release her mother and she is not ready to be released or go into a rehab facility. She stated that she was just not happy at this time and does not feel comfortable making the post op appointment. I told her I understood and she could just give us a call back when she is ready to set it up. She understood.

## 2018-01-31 NOTE — SUBJECTIVE & OBJECTIVE
Interval History: Pt resting in bed taking, daughter Esha Cummings at bedside. Daughter is extremely concerned about pt not having BM however I explained to her pt is likely impacted and will likely need some mechanical removal. Pt is tired and does not feel like she is able to have a BM right now, but she has no abdominal pain.     Review of Systems   Constitutional: Negative for chills and fever.   Respiratory: Negative for shortness of breath.    Cardiovascular: Negative for chest pain and palpitations.   Gastrointestinal: Positive for constipation. Negative for abdominal pain, nausea and vomiting.   Genitourinary: Negative for difficulty urinating.        Incontinence   Musculoskeletal: Positive for arthralgias and back pain.   Skin: Negative for color change.   Neurological: Negative for light-headedness and headaches.   Psychiatric/Behavioral: Negative for sleep disturbance.     Objective:     Vital Signs (Most Recent):  Temp: 98.7 °F (37.1 °C) (01/31/18 0751)  Pulse: 72 (01/31/18 0751)  Resp: 18 (01/31/18 0751)  BP: (!) 183/75 (01/31/18 0751)  SpO2: 96 % (01/31/18 0837) Vital Signs (24h Range):  Temp:  [97.4 °F (36.3 °C)-98.7 °F (37.1 °C)] 98.7 °F (37.1 °C)  Pulse:  [67-85] 72  Resp:  [15-20] 18  SpO2:  [95 %-97 %] 96 %  BP: (133-183)/(67-78) 183/75     Weight: 89.8 kg (198 lb)  Body mass index is 38.67 kg/m².    Intake/Output Summary (Last 24 hours) at 01/31/18 1137  Last data filed at 01/31/18 0645   Gross per 24 hour   Intake              240 ml   Output              958 ml   Net             -718 ml      Physical Exam   Constitutional: She is oriented to person, place, and time. She appears well-developed and well-nourished. No distress.   HENT:   Head: Normocephalic and atraumatic.   Eyes: EOM are normal.   Neck: Normal range of motion.   Cardiovascular: Normal rate and regular rhythm.    No murmur heard.  Pulmonary/Chest: Effort normal and breath sounds normal. She has no wheezes.   Abdominal: Soft. Bowel  sounds are normal. She exhibits no distension. There is no tenderness.   Full appearing abdomen   Musculoskeletal: Normal range of motion. She exhibits no edema.   Neurological: She is alert and oriented to person, place, and time.   Skin: Skin is warm and dry. No erythema. No pallor.   Right hip incisions clean and dry, no erythema or edema.   Psychiatric: She has a normal mood and affect. Her behavior is normal.       Significant Labs:   BMP:   Recent Labs  Lab 01/31/18  0538   *   *   K 4.5      CO2 26   BUN 31*   CREATININE 1.1   CALCIUM 8.9   MG 1.9     CBC:   Recent Labs  Lab 01/30/18  0422 01/31/18  0538   WBC 10.56 10.28   HGB 7.5* 7.7*   HCT 23.0* 24.1*    257       Significant Imaging:

## 2018-01-31 NOTE — ASSESSMENT & PLAN NOTE
Mobitz type 1 second degree AV block  Cardiac pacemaker in situ  HR 67-78.      Pacemaker in place.  Monitor on telemetry.       55

## 2018-01-31 NOTE — PLAN OF CARE
Problem: Patient Care Overview  Goal: Plan of Care Review  Outcome: Ongoing (interventions implemented as appropriate)  Pt's SpO2 96% on room air. No respiratory distress noted. Will continue to monitor SpO2.

## 2018-01-31 NOTE — ASSESSMENT & PLAN NOTE
-183.    Holding home lisinopril for ESPERANZA, may resume soon as cr has improved to 1.1.  Starting scheduled hydralazine.  Monitor. PRN clonidine.

## 2018-01-31 NOTE — PROGRESS NOTES
attempted to schedule an appointment with in 10 days for Dr. Lynn post-op follow up.   informed Dr. Lynn's first available appointment is in March 2018.  She said a nurse will have to call patient with an appointment for within 10 days.   request that patient's daughter, Gricelda [phone] be notified regarding scheduled appointment.

## 2018-01-31 NOTE — PT/OT/SLP PROGRESS
Occupational Therapy  Visit Attempt    Patient Name:  Esha Torres   MRN:  [id number]    Patient not seen today (8881) secondary to pt remains without bowel movement at this time; pt uncomfortable from constipation; nsg present to administer medications. Pt receiving enemas as well. Requesting hold therapy at this time   . Will follow-up as available.    Jennifer Sorto, OT  1/31/2018

## 2018-01-31 NOTE — NURSING
Nitroglycerin tab administered sublingually.   Bladder scan performed revealing .850 ml of urine.  Jensen catheter placed by Jessica Mitchell RN, OC.  Blood drawn, waiting xray.

## 2018-01-31 NOTE — NURSING
Patient complaining of being hot.  Temp check = 97.4, B/P 169/86, HR 86 .  During assessment patient states that she feels a tightness across her chest with the feeling that she cannot breathe.  Dr. Del Castillo is in the unit and advised of patient's situation.  Repeat vitals reveal B/P 173/79, HR 93, RR 22.  NEw orders for Nitroglycerin tab, EKG, labs and xray received and noted.

## 2018-01-31 NOTE — PLAN OF CARE
[facility]  Kiran Del Castillo MD, RUST   MD No Rocha, CHELA Rooney, JIMMY Collins PA-C  [address] [place] [address]  Office Phone: [phone]  Office Fax: [phone]         NURSING HOME ORDERS    01/31/2018    Admit to Nursing Home:     Skilled Bed                                                  Diagnoses:  Active Hospital Problems    Diagnosis  POA    *Intertrochanteric fracture of right femur [S72.141A]  Yes     Priority: 1 - High    Postoperative anemia due to acute blood loss [D62]  No     Priority: 2     Benign essential hypertension [I10]  Yes     Priority: 3      Chronic    Chronic diastolic heart failure [I50.32]  Yes     Priority: 4      Chronic     1/28/2018    1 - Normal left ventricular systolic function (EF 60-65%).     2 - Normal left ventricular diastolic function.     3 - Normal right ventricular systolic function .       SSS (sick sinus syndrome) [I49.5]  Yes     Priority: 5      Chronic    CKD (chronic kidney disease), stage III [N18.3]  Yes     Priority: 6      Chronic    History of provoked pulmonary embolism [Z86.711]  Yes     Priority: 7      Chronic    Gastroesophageal reflux disease without esophagitis [K21.9]  Yes     Priority: 8      Chronic    Senile osteoporosis [M81.0]  Yes     Priority: 9      Chronic    Abrasion of forearm [S50.819A]  Yes     Priority: 10     Constipation [K59.00]  Unknown     Priority: 11     ESPERANZA (acute kidney injury) [N17.9]  No    Closed fracture of right hip [S72.001A]  Yes    Cardiac pacemaker in situ [Z95.0]  Yes     Chronic    Mobitz type 1 second degree AV block [I44.1]  Yes     Chronic      Resolved Hospital Problems    Diagnosis Date Resolved POA   No resolved problems to display.       Patient is homebound due to:  Intertrochanteric fracture of right femur    Allergies:  Review of patient's allergies indicates:    Allergen Reactions    Hydrochlorothiazide Diarrhea       Vitals: Per facility protocol    Diet: Adult regular    Acitivities:      - May use walker or self-propelled wheelchair  -Defer to rehab      LABS:  Per facility protocol    Nursing Precautions:     - Aspiration precautions:             -  Upright 90 degrees befor during and after meals      - Fall precautions per nursing home protocol   - Decubitus precautions:        -  for positioning   - Pressure reducing foam mattress   - Turn patient every two hours. Use wedge pillows to anchor patient    CONSULTS:      Physical Therapy to evaluate and treat     Occupational Therapy to evaluate and treat    MISCELLANEOUS CARE:      Routine Skin:  Apply moisture barrier cream to all    skin folds and wet areas in perineal area daily and after baths and                           all bowel movements.      Medications: Discontinue all previous medication orders, if any. See new list below.  Medications: Discontinue all previous medication orders, if any. See new list below.          Esha Torres   Home Medication Instructions KAJAL:[id number]    Printed on:01/31/18 3016   Medication Information                      ascorbic acid, vitamin C, (VITAMIN C) 500 MG tablet  Take 500 mg by mouth once daily.             aspirin 81 MG Chew  Take 1 tablet (81 mg total) by mouth once daily.             enoxaparin (LOVENOX) 30 mg/0.3 mL Syrg  Inject 0.3 mLs (30 mg total) into the skin once daily.  Stop date: 2/27/2018             ERGOCALCIFEROL, VITAMIN D2, (VITAMIN D ORAL)  Take by mouth once daily.              furosemide (LASIX) 20 MG tablet  Take 1 tablet (20 mg total) by mouth as needed.             hydrALAZINE (APRESOLINE) 10 MG tablet  Take 1 tablet (10 mg total) by mouth every 12 (twelve) hours.             hydrocodone-acetaminophen 5-325mg (NORCO) 5-325 mg per tablet  Take 1 tablet by mouth every 8 (eight) hours as needed (pain not relieved with acetaminophen).              lactulose (CHRONULAC) 20 gram/30 mL Soln  Take 30 mLs (20 g total) by mouth once daily.             lisinopril (PRINIVIL,ZESTRIL) 40 MG tablet  Take 1 tablet (40 mg total) by mouth once daily.             multivitamin (THERAGRAN) per tablet  Take 1 tablet by mouth once daily.             omeprazole (PRILOSEC) 20 MG capsule  Take 1 capsule (20 mg total) by mouth once daily.             potassium chloride SA (K-DUR,KLOR-CON) 20 MEQ tablet  Take 1 tablet (20 mEq total) by mouth once daily.             senna-docusate 8.6-50 mg (PERICOLACE) 8.6-50 mg per tablet  Take 1 tablet by mouth 2 (two) times daily.                         _________________________________  Cyndi Collins PA-C  01/31/2018

## 2018-01-31 NOTE — PROGRESS NOTES
received message from [facility] admit coordinator, Aylin stating she has received authorization for skilled nursing facility admission. She request document bowel movement and recent labs.   faxed labs via [facility].   will send proof of bowel movement after it has been documented.

## 2018-01-31 NOTE — ASSESSMENT & PLAN NOTE
No evidence of volume overload, per daughter even usual edema is improved. 2D Echo 1/28/18 notes normal systolic function and no diastolic dysfunction.      Stop scheduled furosemide. Monitor I&O and daily weights.

## 2018-01-31 NOTE — PROGRESS NOTES
faxed transfer facility orders and Philip prescription to [facility] admit coordinatorAylin to review for admission today.

## 2018-01-31 NOTE — ASSESSMENT & PLAN NOTE
Appears baseline hgb is ~ 11. 10.7 on admission. Transfusion of 1 unit of PRBC on 1/29/2018 with increase from 6.9 to 7.5.  Has increased to 7.7 today. No visible, active bleeding.     Monitor.

## 2018-01-31 NOTE — PROGRESS NOTES
informed [facility] admit coordinatorAylin that patient will not discharge today.   said she will follow up with her in the am regarding status of discharge.

## 2018-01-31 NOTE — PT/OT/SLP PROGRESS
Occupational Therapy  Visit Attempt    Patient Name:  Esha Torres   MRN:  [id number]    Patient not seen today secondary to nsg care at this time 0857  . Will follow-up as available.    Jennifer Sorto OT  1/31/2018

## 2018-01-31 NOTE — TELEPHONE ENCOUNTER
----- Message from Artur Rice sent at 1/31/2018  1:11 PM CST -----  Contact: [phone]/prashant MADISON  Case management called stating the pt need to be seen within 10 days for a POST-OP appointment.  Please contact pt prashant Madison at [phone].  Please call and advise

## 2018-01-31 NOTE — ASSESSMENT & PLAN NOTE
Closed fracture of right hip  S/p ORIF on 1/27/2018, POD #4.  PT/OT recommend SNF.  Steri strips on right hip incision appearing clean and dry with no sign of infection.      Continue conservative pain mgmt with bowel regimen. Continue enoxaparin for DVT prophylaxis through 2/27/18.

## 2018-01-31 NOTE — ASSESSMENT & PLAN NOTE
ESPERANZA  Has returned to baseline 1.1 from Cr 1.7 yesterday.     Pt uses lasix only as needed at home. Edema has improved.  Avoid nephrotoxins. Monitor.
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
## 2018-02-01 PROBLEM — K59.81 OGILVIE'S SYNDROME: Status: ACTIVE | Noted: 2018-01-01

## 2018-02-01 PROBLEM — I26.99 ACUTE PULMONARY EMBOLISM: Status: ACTIVE | Noted: 2018-01-01

## 2018-02-01 PROBLEM — E87.1 HYPONATREMIA: Status: ACTIVE | Noted: 2018-01-01

## 2018-02-01 PROBLEM — E87.20 LACTIC ACIDOSIS: Status: ACTIVE | Noted: 2018-01-01

## 2018-02-01 PROBLEM — E87.5 HYPERKALEMIA: Status: ACTIVE | Noted: 2018-01-01

## 2018-02-01 NOTE — NURSING
Portable chest xray at bedside.  Orders received to transfer patient to Cat Scan then to room 266 in ICU.  Patient transferred to CT via hospital bed with House Supervisor and PCT Evette assisting.  Patient placed on Zoll monitor.

## 2018-02-01 NOTE — PLAN OF CARE
Problem: Patient Care Overview  Goal: Plan of Care Review  Outcome: Ongoing (interventions implemented as appropriate)  Pt in NAD. V/s stable. AAOx4. Scheduled medications given per MAR. PIV saline locked dressing CDI. Incision to R hip open to air with steri strips edges approximated. Pt having watery loose stool continuously, rectal tube placed moderately draining stool. Jensen in place draining clear yellow urine. Continuous cardiac monitoring in place NSR. Daughter at bedside. Encouraged to call for assistance verbalized understanding. Safety maintained bed in low locked position, SR up X2, bed alarm on, and call bell in reach. Will continue to monitor.

## 2018-02-01 NOTE — NURSING
Called to patient's room .  Patient is pale and diaphoretic.  Called DINA Jones with Dr. Del Castillo to please come see patient.  O2 sat check reveals patient is 78% on 2L per nasal cannula.

## 2018-02-01 NOTE — PROGRESS NOTES
Physical Therapy  Missed Visit    Patient Name:  Esha Torres   MRN:  [id number]    Patient not seen today secondary to MET in progress as MD and nursing in room assessing pt. Will follow-up as able.    Sheron Tamez, PT   2/1/2018

## 2018-02-01 NOTE — NURSING TRANSFER
Nursing Transfer Note      2/1/2018     Transfer to ICU room 266    Transfer via hospital bed    Transfer with Philippe RACHEL and ICU charge RN Glenda and Yoanna RACHEL ICU     Transported by hospital bed     Medicines sent: none     Chart send with patient: yes       Patient reassessed at: 02/01/2018    Upon arrival to floor: Pt AAOx4, appears uncomfortable c/o upper abdomen pain, pt placed on cardiac monitor, pt oriented to unit, fall risk measures and prevention methods reviewed with pt, pt verbalizes understanding, multiple abrasions and skin tears noted, Right incisional hip sx site x2 noted with steri strips in place small blister noted, pulses bilateral DP+2, pt skin feels cool and and toes discolored. Difficulty obtaining pulse ox, pox 100% on venti mask.
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
## 2018-02-01 NOTE — PROGRESS NOTES
Pharmacist Renal Dose Adjustment Note    Esha Torres is a 88 y.o. female being treated with the medication cefepime    Patient Data:    Vital Signs (Most Recent):  Temp: 96.8 °F (36 °C) (02/01/18 1140)  Pulse: 96 (02/01/18 1215)  Resp: (!) 23 (02/01/18 1215)  BP: (!) 128/55 (02/01/18 1215)  SpO2: 100 % (02/01/18 1215)   Vital Signs (72h Range):  Temp:  [96.7 °F (35.9 °C)-99.1 °F (37.3 °C)]   Pulse:  []   Resp:  [14-32]   BP: (112-191)/(55-99)   SpO2:  [78 %-100 %]        Recent Labs     Lab 01/31/18  0538 02/01/18  0535 02/01/18  1047   CREATININE 1.1 1.3 1.7*     Serum creatinine: 1.7 mg/dL High 02/01/18 1047  Estimated creatinine clearance: 22.8 mL/min    Medication:cefepime dose: 2g frequency q12 will be changed to medication:cefepime dose:2g frequency:q24    Pharmacist's Name: Jamari Loving  Pharmacist's Extension: [phone]

## 2018-02-01 NOTE — NURSING TRANSFER
Nursing Transfer Note      2/1/2018     Transfer to  from 526    Transfer via Hospital bed    Transfer with Zoll monitor, portable oxygen, khanna, Flexiseal    Transported by Philippe Owens RN, Evette CRAWLEY, Aixa RN, House Supervisor    Medicines sent: none    Chart send with patient: Yes    Notified: Family at bedside with patient    Patient reassessed at: 02/01/2018 1115  Upon arrival to floor: 1115

## 2018-02-01 NOTE — PT/OT/SLP PROGRESS
Occupational Therapy  Hold OT 2/1/18    Patient Name:  Esha Torres   MRN:  [id number]    Patient not seen today secondary to MET and t/f to ICU  . Will follow-up as new orders received and/or clearance received from MD to resume therapy.    Jennifer Sorto, OT  2/1/2018

## 2018-02-01 NOTE — PROGRESS NOTES
Physical Therapy  PT Hold    Patient Name:  Esha Torres   MRN:  [id number]    Patient not seen today secondary to pt transferred to ICU. Will follow-up as pt cleared to resume therapy by MD.    Sheron Tamez, PT   2/1/2018

## 2018-02-01 NOTE — PT/OT/SLP PROGRESS
Occupational Therapy  Visit Attempt    Patient Name:  Esha Torres   MRN:  [id number]    Patient not seen today secondary to MET 1046  . Will follow-up as available.    Jennifer Sorto OT  2/1/2018

## 2018-02-02 PROBLEM — K55.9 ISCHEMIC COLITIS, ENTERITIS, OR ENTEROCOLITIS: Status: ACTIVE | Noted: 2018-01-01

## 2018-02-02 NOTE — PROGRESS NOTES
Progress Note  Surgery    Admit Date: 1/26/2018  Attending: Chemo  S/P: Procedure(s) (LRB):  EXPLORATORY-LAPAROTOMY (N/A)  RESECTION-BOWEL (N/A)    Post-operative Day: Day of Surgery    Hospital Day: 8    SUBJECTIVE:     Levophed started last night.  Level remains stable at 1.  Remains intubated for open abdomen.  Transfused this AM for Hb of 5.5    OBJECTIVE:     Vital Signs (Most Recent)  Temp: (!) 93.4 °F (34.1 °C) (warming blanket on pt) (02/02/18 0845)  Pulse: 87 (02/02/18 0845)  Resp: (!) 36 (02/02/18 0845)  BP: 123/66 (02/02/18 0800)  SpO2: 95 % (02/02/18 0845)    Vital Signs Range (Last 24H):  Temp:  [92.2 °F (33.4 °C)-96.8 °F (36 °C)]   Pulse:  [0-144]   Resp:  [11-51]   BP: (102-165)/(55-99)   SpO2:  [72 %-100 %]   Arterial Line BP: ()/(42-65)     I & O (Last 24H):  Intake/Output Summary (Last 24 hours) at 02/02/18 0909  Last data filed at 02/02/18 0847   Gross per 24 hour   Intake          7472.23 ml   Output             1125 ml   Net          6347.23 ml       Physical Exam:  Gen: Intubated, sedated  HEENT: NCAT.  ETT in place  CV: Paced rhythm on monitor  Pulm: Mechanical breath sounds  Abd: Soft, non responsive to palpation. No rebound, guarding.   Extremities: no cyanosis or edema, or clubbing  Skin: Skin color, texture, turgor normal. No rashes or lesions    Laboratory:  CBC:   Recent Labs  Lab 02/02/18  0431   WBC 9.24   RBC 1.81*   HGB 5.5*   HCT 18.2*      *   MCH 30.4   MCHC 30.2*     CMP:   Recent Labs  Lab 02/01/18  2255  02/02/18  0550   GLU 79  < > 46*   CALCIUM 7.6*  < > 7.1*   ALBUMIN 1.8*  --   --    PROT 4.5*  --   --    *  < > 139   K 5.7*  < > 5.7*   CO2 9*  < > 8*     < > 107   BUN 43*  < > 39*   CREATININE 1.6*  < > 1.7*   ALKPHOS 133  --   --    ALT 86*  --   --    *  --   --    BILITOT 0.9  --   --    < > = values in this interval not displayed.  Labs within the past 24 hours have been reviewed.    ASSESSMENT/PLAN:     Assessment: 88 year  old woman with necrotic, but non perforated small bowel and colon s/p resection (80 cm of small bowel and all of colon) on 2/1.    Plan:  Will tentatively plan second look in the OR tomorrow    Misael Wood MD  General Surgery, PGY-4  [phone]

## 2018-02-02 NOTE — PROGRESS NOTES
Results for KARENA LEE (MRN [id number]) as of 2/2/2018 00:09   Ref. Range 2/1/2018 23:46   POC PH Latest Ref Range: 7.35 - 7.45  7.034 (LL)   POC PCO2 Latest Ref Range: 35 - 45 mmHg 33.2 (L)   POC PO2 Latest Ref Range: 80 - 100 mmHg 137 (H)   POC BE Latest Ref Range: -2 to 2 mmol/L -22   POC HCO3 Latest Ref Range: 24 - 28 mmol/L 8.8 (L)   POC SATURATED O2 Latest Ref Range: 95 - 100 % 97   POC TCO2 Latest Ref Range: 23 - 27 mmol/L 10 (L)   FiO2 Unknown 100   Vt Unknown 400   PEEP Unknown 5   Sample Unknown ARTERIAL   DelSys Unknown Adult Vent   Site Unknown Julian/UAC   Mode Unknown AC/PRVC   Results reported to MITZI Winkler

## 2018-02-02 NOTE — SUBJECTIVE & OBJECTIVE
Interval History: Had code event overnight and had ROSC quickly. Was responding to commands and interacting after the event. Taken to OR overnight and was found to have ischemic section of small intestine and the entire colon.     Review of Systems   Unable to perform ROS: Intubated     Objective:     Vital Signs (Most Recent):  Temp: (!) 92 °F (33.3 °C) (02/02/18 1500)  Pulse: 77 (02/02/18 1700)  Resp: (!) 33 (02/02/18 1700)  BP: (!) 89/61 (02/02/18 1630)  SpO2:  (can not get o2 sats to , pt cool, temp low) (02/02/18 1245) Vital Signs (24h Range):  Temp:  [92 °F (33.3 °C)-96.8 °F (36 °C)] 92 °F (33.3 °C)  Pulse:  [0-144] 77  Resp:  [12-51] 33  SpO2:  [72 %-100 %] 91 %  BP: ()/(56-90) 89/61  Arterial Line BP: ()/(40-66) 125/66     Weight: 89.8 kg (198 lb)  Body mass index is 38.67 kg/m².    Intake/Output Summary (Last 24 hours) at 02/02/18 1731  Last data filed at 02/02/18 1711   Gross per 24 hour   Intake          8501.75 ml   Output             3027 ml   Net          5474.75 ml      Physical Exam   Constitutional: She appears well-developed and well-nourished. She appears distressed. She is sedated and intubated.   HENT:   Head: Normocephalic and atraumatic.   Cardiovascular: Normal rate and regular rhythm.    No murmur heard.  Pulmonary/Chest: Effort normal and breath sounds normal. She is intubated. No respiratory distress. She has no wheezes.   Abdominal: She exhibits distension. Bowel sounds are decreased.   Wound vac in place   Musculoskeletal: She exhibits no edema.   Neurological: She is unresponsive.   Skin: No erythema. There is pallor.   Nursing note and vitals reviewed.      Significant Labs:   CBC:   Recent Labs  Lab 02/01/18  1654 02/01/18  2255 02/01/18  2346 02/02/18  0431 02/02/18  1028 02/02/18  1322   WBC 18.55* 25.90*  --  9.24  --   --    HGB 10.3* 8.8*  --  5.5*  --  8.4*  8.4*   HCT 33.3* 28.3* 25* 18.2*  --  27.3*  27.3*     302 382*  --  232 191  --      CMP:    Recent Labs  Lab 02/01/18  1047  02/01/18  2255  02/02/18  0940 02/02/18  1322 02/02/18  1552   *  < > 135*  < > 137 135* 136   K 5.5*  < > 5.7*  < > 6.9* 6.8* 6.3*   CL 95  < > 106  < > 105 106 106   CO2 12*  < > 9*  < > 6* 6* 5*   *  < > 79  < > 43* 188* 77   BUN 43*  < > 43*  < > 40* 33* 25*   CREATININE 1.7*  < > 1.6*  < > 1.9* 1.5* 1.3   CALCIUM 9.6  < > 7.6*  < > 7.3* 6.9* 7.0*   PROT 6.0  --  4.5*  --   --   --   --    ALBUMIN 2.3*  --  1.8*  --   --  2.0*  --    BILITOT 1.3*  --  0.9  --   --   --   --    ALKPHOS 136*  --  133  --   --   --   --    AST 29  --  280*  --   --   --   --    ALT 8*  --  86*  --   --   --   --    ANIONGAP 23*  < > 20*  < > 26* 23* 25*   EGFRNONAA 27*  < > 29*  < > 23* 31* 37*   < > = values in this interval not displayed.  Coagulation:   Recent Labs  Lab 02/02/18  1028   INR 3.1*  3.1*   APTT 61.6*  61.6*     Lactic Acid:   Recent Labs  Lab 02/02/18  0550 02/02/18  0940 02/02/18  1552   LACTATE >12.0* >12.0* >12.0*     Magnesium:   Recent Labs  Lab 02/01/18  2255 02/02/18  0550 02/02/18  1552   MG 2.7* 2.8* 2.3     POCT Glucose:   Recent Labs  Lab 02/01/18  1041 02/02/18  1159 02/02/18  1204   POCTGLUCOSE 283* 49* 24*       Significant Imaging: I have reviewed all pertinent imaging results/findings within the past 24 hours.

## 2018-02-02 NOTE — PLAN OF CARE
Pt remains in ICU; intubated  Progress notes reviewed:  POD #1 s/p small bowel resection (80 cm of small bowel and all of colon) ; necrotic, but non perforated small bowel and colon     Per surgery , will tentatively plan second look in the OR tomorrow    Per previous TN/sw note, pt was accepted to [facility] due to hip fx, s/p repair on 1/27.    TN to continue to follow and reassess for post op discharge needs .       02/02/18 1333   Discharge Reassessment   Assessment Type Discharge Planning Reassessment   Provided patient/caregiver education on the expected discharge date and the discharge plan No  (intubated; no family currently at bedside)   Do you have any problems affording any of your prescribed medications? TBD   Discharge Plan A Skilled Nursing Facility   Discharge Plan B Home Health;Home with family   Patient choice form signed by patient/caregiver N/A   Can the patient answer the patient profile reliably? No, cognitively impaired;No historian available   How does the patient rate their overall health at the present time? (garrick)   Describe the patient's ability to walk at the present time. Does not walk or unable to take any steps at all   How often would a person be available to care for the patient? Whenever needed   Number of comorbid conditions (as recorded on the chart) Five or more   During the past month, has the patient often been bothered by feeling down, depressed or hopeless? (garrick)   During the past month, has the patient often been bothered by little interest or pleasure in doing things? (garrick)

## 2018-02-02 NOTE — ASSESSMENT & PLAN NOTE
ESPERANZA  Hyperkalemia  Hyponatremia  Had returned to baseline, but continues to increase today. Line placed and Nephrology consulted for CRRT given the refractory metabolic acidosis. Continue on bicarb gtt. Continue to monitor UOP.
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
## 2018-02-02 NOTE — TRANSFER OF CARE
Anesthesia Transfer of Care Note    Patient: Esha Torres    Procedure(s) Performed: Procedure(s) (LRB):  EXPLORATORY-LAPAROTOMY (N/A)  RESECTION-BOWEL (N/A)    Patient location: ICU    Anesthesia Type: general    Transport from OR: Upon arrival to PACU/ICU, patient attached to ventilator and auscultated to confirm bilateral breath sounds and adequate TV. Continuous ECG monitoring in transport. Continuous SpO2 monitoring in transport. Continuos invasive BP monitoring in transport    Post pain: adequate analgesia    Post assessment: no apparent anesthetic complications    Post vital signs: stable    Level of consciousness: sedated    Nausea/Vomiting: no nausea/vomiting    Complications: none    Transfer of care protocol was followedComments: Transported to ICU via bed with ASA monitors. Report given to ICU nurse Rashaun. Safety maintained.       Last vitals:   Visit Vitals  /78 (BP Location: Left arm, Patient Position: Lying)   Pulse 98   Temp (!) 34.2 °C (93.5 °F) (Axillary)   Resp (!) 43   Ht 5' (1.524 m)   Wt 89.8 kg (198 lb)   LMP  (LMP Unknown)   SpO2 100%   Breastfeeding? No   BMI 38.67 kg/m²
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
## 2018-02-02 NOTE — PLAN OF CARE
Pt off unit, to OR on cardiac monitor. Intubated, on Levo and Bicarb gtt. Consents signed. Chart present. Safety precautions in place. Daughter guided to the waiting room.

## 2018-02-02 NOTE — PT/OT/SLP DISCHARGE
Physical Therapy Discharge Summary    Name: Esha Torres  MRN: [id number]   Principal Problem: Intertrochanteric fracture of right femur     Patient Discharged from acute Physical Therapy on 18.  Please refer to prior PT noted date on 18 for functional status.     Assessment:      Patient transferred to lower level of care secondary to MET, code blue following, to OR for emergent ex-lap; now intubated     Objective:     GOALS:    Physical Therapy Goals        Problem: Physical Therapy Goal    Goal Priority Disciplines Outcome Goal Variances Interventions   Physical Therapy Goal     PT/OT, PT Ongoing (interventions implemented as appropriate)     Description:  Goals to be met by: 2018     Patient will increase functional independence with mobility by performin. Supine to sit with MInimal Assistance  2. Sit to supine with MInimal Assistance  3. Sit to stand transfer with Minimal Assistance  4. Bed to chair transfer with Minimal Assistance using Rolling Walker  5. Gait  x 75 feet with Minimal Assistance using Rolling Walker.   6. Ascend/Descend 5 inch curb step with Minimal Assistance using Rolling Walker if able  7. Lower extremity exercise program x10 reps with assistance as needed                      Reasons for Discontinuation of Therapy Services  Transfer to alternate level of care.      Plan:     Patient Discharged to: ICU.    Sheron Tamez, PT  2018

## 2018-02-02 NOTE — BRIEF OP NOTE
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 Left in discontinuity.  [id number]    Description of the Findings of the Procedure: Stomach reduced into the abdomen - appeared normal, NG advanced into stomach, no signs of necrosis or perforation of stomach.  Entire colon was grey and purple with patchy areas of obvious necrosis, no perforation was found but it was massively distended.  About 80cm of jejunum was resected due to obvious necrosis beginning about 30cm from the ligament of treitz.    Significant Surgical Tasks Conducted by the Assistant(s), if Applicable:     Complications: No    Estimated Blood Loss (EBL): 100mL           Implants: * No implants in log *    Specimens:   Specimen (12h ago through future)    Start     Ordered    02/02/18 0526  Specimen to Pathology - Surgery  Once     Comments:  Pre-op Diagnosis: SBO (small bowel obstruction) [K56.609]Post-op Diagnosis: SBO (small bowel obstruction)Procedure(s):EXPLORATORY-LAPAROTOMY Number of specimens: 2Name of specimens: 1. Jejunum - perm2. Colon - perm      02/02/18 0527                  Condition: Critical    Disposition: ICU - intubated and critically ill.    Attestation: I was present and scrubbed for the entire procedure.

## 2018-02-02 NOTE — ASSESSMENT & PLAN NOTE
Possible [place]'s syndrome  Lactic acidosis  Possible colitis  POD #0 from total colectomy and partial small bowel resection. Lactic acidosis persists. Started on levophed overnight and will start on vasopressin. Appreciate General Surgery assistance. Continue cefepime, metronidazole, and started on micafungin overnight. Continue IV fluids. Continue NGT.
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
## 2018-02-02 NOTE — TELEPHONE ENCOUNTER
Spoke to pt's daughter, Gricelda and states that her mom had a fall on last Friday. Pt had surgery on Saturday for a fractured hip. Daughter stated that he health has declined since the surgery and she coded last night in ICU. Dr. Barrera spoke to pt's daughter.

## 2018-02-02 NOTE — ASSESSMENT & PLAN NOTE
Hypotensive. Holding home lisinopril for ESPERANZA and recently added scheduled hydralazine.  Monitor. PRN clonidine.

## 2018-02-02 NOTE — PLAN OF CARE
Spoke with Dr. Mclain. Ok to emergently admin 2units pRBCs d/t patient not having blood lock on armband. Will admin 2units and obtain new type and screen and blood lock code. MD also states to continue holding heparin gtt.   Corinne Kohli RN

## 2018-02-02 NOTE — ASSESSMENT & PLAN NOTE
Has LLL segmental PTE on CTA today despite being on lovenox ppx. Start on heparin gtt for now. Monitor.

## 2018-02-02 NOTE — PLAN OF CARE
Problem: Patient Care Overview  Goal: Plan of Care Review  Outcome: Ongoing (interventions implemented as appropriate)  Patient received on ventilator. Settings on flowsheet.  Alarms checked and audible.  Vent checked.  Resuscitation equipment at bedside.    Will continue to monitor patient's ventilatory and oxygenation status.
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
## 2018-02-02 NOTE — ASSESSMENT & PLAN NOTE
No evidence of volume overload. 2D Echo 1/28/18 notes normal systolic function and no diastolic dysfunction.  Holding home furosemide for now.

## 2018-02-02 NOTE — PT/OT/SLP DISCHARGE
Occupational Therapy Discharge Summary    Esha Torres  MRN: [id number]   Principal Problem: Intertrochanteric fracture of right femur      Patient Discharged from acute Occupational Therapy on 2/1/18.  Please refer to prior OT note dated 1/30/18 for functional status.    Assessment:      Patient transferred to lower level of care secondary to MET, code blue following, to OR for emergent ex-lap; now intubated     Objective:     GOALS:    Occupational Therapy Goals        Problem: Occupational Therapy Goal    Goal Priority Disciplines Outcome Interventions   Occupational Therapy Goal     OT, PT/OT Ongoing (interventions implemented as appropriate)    Description:  Goals to be met by: 2/28/2018     Patient will increase functional independence with ADLs by performing:    UE Dressing with Stand-by Assistance.  LE Dressing with Minimal Assistance and Moderate Assistance.  Grooming while EOB with Supervision.  Toileting from bedside commode with Minimal Assistance for hygiene and clothing management.   Bathing from  edge of bed with Minimal Assistance.  Toilet transfer to bedside commode with Minimal Assistance.    Has rollator, grab bars, shower chair, & quad cane.  DME & post-acute therapy needs TBD pending progress with OT.                       Reasons for Discontinuation of Therapy Services  Transfer to alternate level of care.      Plan:     Patient Discharged to: ICU    Jennifer Sorto, OT  2/2/2018

## 2018-02-02 NOTE — ANESTHESIA POSTPROCEDURE EVALUATION
Anesthesia Post Evaluation    Patient: Esha Torres    Procedure(s) Performed: Procedure(s) (LRB):  EXPLORATORY-LAPAROTOMY (N/A)  RESECTION-BOWEL (N/A)    Final Anesthesia Type: general  Patient location during evaluation: ICU  Patient participation: No - Unable to Participate, Intubation  Post-procedure vital signs: reviewed and stable  Pain management: adequate  Airway patency: patent    Anesthetic complications: no      Cardiovascular status: hypotensive  Respiratory status: intubated  Hydration status: euvolemic          Visit Vitals  /63   Pulse 88   Temp (!) 34.1 °C (93.4 °F) (Axillary)   Resp (!) 36   Ht 5' (1.524 m)   Wt 89.8 kg (198 lb)   LMP  (LMP Unknown)   SpO2 (!) 94%   Breastfeeding? No   BMI 38.67 kg/m²       Pain/Alicia Score: Pain Assessment Performed: Yes (2/2/2018  7:15 AM)  Presence of Pain: non-verbal indicators absent (2/2/2018  7:15 AM)  Pain Rating Prior to Med Admin: 6 (2/2/2018 12:41 AM)  Pain Rating Post Med Admin: 2 (2/2/2018  1:11 AM)

## 2018-02-02 NOTE — ASSESSMENT & PLAN NOTE
Philipp's syndrome  Lactic acidosis  Possible colitis  Started on cefepime and metronidazole. Continue IV fluids. Consulted General Surgery for evaluation. Has rectal tube in place. Monitor output. Place NGT and make NPO for now.
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
## 2018-02-02 NOTE — TRANSFER OF CARE
Anesthesia Transfer of Care Note    Patient: Esha Torres    Procedure(s) Performed: Procedure(s) (LRB):  EXPLORATORY-LAPAROTOMY (N/A)  RESECTION-BOWEL (N/A)    Anesthesia PACU Handoff    Last vitals:   Visit Vitals  /78 (BP Location: Left arm, Patient Position: Lying)   Pulse 98   Temp (!) 34.2 °C (93.5 °F) (Axillary)   Resp (!) 43   Ht 5' (1.524 m)   Wt 89.8 kg (198 lb)   LMP  (LMP Unknown)   SpO2 100%   Breastfeeding? No   BMI 38.67 kg/m²

## 2018-02-02 NOTE — PROCEDURES
"Central Line  Date/Time: 2/2/2018 10:35 AM  Performed by: FARTUN DE DIOS JR  Consent Done: Yes  Time out: Immediately prior to procedure a "time out" was called to verify the correct patient, procedure, equipment, support staff and site/side marked as required.  Indications: hemodialysis, vascular access, med administration and hemodynamic monitoring  Preparation: skin prepped with ChloraPrep  Skin prep agent dried: skin prep agent completely dried prior to procedure  Sterile barriers: all five maximum sterile barriers used - cap, mask, sterile gown, sterile gloves, and large sterile sheet  Hand hygiene: hand hygiene performed prior to central venous catheter insertion  Location details: left internal jugular  Catheter type: trialysis  Catheter Length: 19cm    Ultrasound guidance: yes  Number of attempts: 1  Post-procedure: line sutured,  chlorhexidine patch,  sterile dressing applied and blood return through all ports        "

## 2018-02-02 NOTE — ASSESSMENT & PLAN NOTE
ESPERANZA  Hyperkalemia  Hyponatremia  Had returned to baseline, but back up today. Likely related to volume depletion, so giving IV fluids. Elevated potassium is likely related to the acidosis and shifting of the potassium. Starting on bicarb gtt. Continue to monitor UOP and potassium level.

## 2018-02-02 NOTE — ANESTHESIA PROCEDURE NOTES
Central Line    Diagnosis: s/p cardiac arrest  Patient location during procedure: ICU  Staffing  Anesthesiologist: LORENE TEIXEIRA  Performed: anesthesiologist   Anesthesiologist was present at the time of the procedure.  Preanesthetic Checklist  Completed: patient identified, site marked, surgical consent, pre-op evaluation, timeout performed, IV checked, risks and benefits discussed, monitors and equipment checked and anesthesia consent given  Indication  Indication: hemodynamic monitoring, med administration, vascular access     Anesthesia   general anesthesia    Central Line  Skin Prep: skin prepped with ChloraPrep, skin prep agent completely dried prior to procedure  hand hygiene performed prior to central venous catheter insertion  Location: right internal jugular,   Catheter type: triple lumen  Catheter Size: 7 Fr  Inserted Catheter Length: 16 cm  Ultrasound: vascular probe with ultrasound  Vessel Caliber: medium, patent, compressibility normal  Needle advanced into vessel with real time Ultrasound guidance.  Guidewire confirmed in vessel.  Sterile sheath used.  Image recorded and saved.   Manometry: Venous cannualation confirmed by visual estimation of blood vessel pressure using manometry.  Insertion Attempts: 1   Securement:line sutured, chlorhexidine patch, sterile dressing applied and blood return through all ports     Post-Procedure  X-Ray: successful placement  Adverse Events:none

## 2018-02-02 NOTE — PLAN OF CARE
Handoff report received from CLYDE Miguel. Pt back in ICU, on cardiac monitor-paced rhythm , remains intubated sats %, 's/40-50's on Levo and Bicarb gtt. Starting 1st unit of PRBC. Midline abd wound vac in place- 450ml total output. Jensen in place- 0 output. Labs drawn. Temp 93 axillary- warming blanket placed on pt. Continuing to closely monitor pt. Family at bedside.       0700: Report given to WILSON Fair.

## 2018-02-02 NOTE — SIGNIFICANT EVENT
Responded to Code Blue about 2040 last night.  Patient's daughter was at the bedside and noticed that patient was not responding and nurse was called to the bedside, finding her with agonal respirations and PEA Paced rhythm on the monitor.  ACLS was initiated and ROSC achieved with 2 rounds a Epinephrine and 2 amps of sodium Bicarb.  She had spontaneous respirations, but ineffective, so the ED physician was asked to intubate the patient.   She remained hypotensive despite normal saline fluid bolus, so norepinephrine drip was started.  Anesthesia was consulted for central line and arterial line.  A new NG tube was placed, but staff was unable to advance further then the antrum of stomach. It was still hooked to LIS.  It was likely coiled within patient's hiatal hernia.  Her abdomen was very distended. Staff were unable to get venous or arterial blood, so ABGs and Labs were delayed until lines were placed.  Once labs returned, her Lactate was > 12, WBC 25.9, PH 7.034, CO2 33.2, BiCarb 8.8.  She was given an additional liter of normal saline and 2 more amps of sodium bicarb.  Dr. Mclain, general surgery, was contacted for her worsening metabolic acidosis in concern for ischemic bowel.  He came in to emergently take her to the operating room.  The patient's condition was discussed at length with 2 daughters who were at the bedside.    Critical Care Time: 1 Hour 15 Minutes of critical care time was spent in the care of the patient. This included management of organ failures related to critical illness, titration of continuous infusions, review of pertinent labs and imaging studies, discussion of the patient with consulting services, and discussion of the patients condition with the patient and family.

## 2018-02-02 NOTE — ASSESSMENT & PLAN NOTE
SBP ranged 112 to 183. Holding home lisinopril for ESPERANZA. Continue scheduled hydralazine.  Monitor. PRN clonidine.

## 2018-02-02 NOTE — SUBJECTIVE & OBJECTIVE
Interval History: Complaining of chest pain this AM and noted to have low pulse ox although paO2 was 156 on FiO2 of 50%. Noted to have some diarrhea/loose stool overnight, so had flexiseal placed.     Review of Systems   Constitutional: Negative for chills and fever.   Respiratory: Positive for shortness of breath. Negative for cough.    Cardiovascular: Positive for chest pain. Negative for palpitations.   Gastrointestinal: Positive for nausea. Negative for vomiting.     Objective:     Vital Signs (Most Recent):  Temp: 96.8 °F (36 °C) (02/01/18 1140)  Pulse: 99 (02/01/18 1700)  Resp: 18 (02/01/18 1700)  BP: (!) 149/65 (02/01/18 1700)  SpO2: (!) 89 % (02/01/18 1700) Vital Signs (24h Range):  Temp:  [96.7 °F (35.9 °C)-98.8 °F (37.1 °C)] 96.8 °F (36 °C)  Pulse:  [] 99  Resp:  [11-39] 18  SpO2:  [78 %-100 %] 89 %  BP: (112-175)/(55-99) 149/65     Weight: 89.8 kg (198 lb)  Body mass index is 38.67 kg/m².    Intake/Output Summary (Last 24 hours) at 02/01/18 1841  Last data filed at 02/01/18 1800   Gross per 24 hour   Intake          2846.67 ml   Output             3060 ml   Net          -213.33 ml      Physical Exam   Constitutional: She is oriented to person, place, and time. She appears well-developed and well-nourished. She appears distressed.   HENT:   Head: Normocephalic and atraumatic.   Cardiovascular: Normal rate and regular rhythm.    No murmur heard.  Pulmonary/Chest: Effort normal and breath sounds normal. No respiratory distress. She has no wheezes.   Abdominal: Soft. She exhibits distension. Bowel sounds are decreased. There is tenderness.   Musculoskeletal: She exhibits no edema.   Neurological: She is alert and oriented to person, place, and time.   Skin: Skin is warm and dry.   Psychiatric: Her affect is blunt.   Nursing note and vitals reviewed.      Significant Labs:   ABGs:   Recent Labs  Lab 02/01/18  1053   PH 7.385   PCO2 19.5*   HCO3 11.7*   POCSATURATED 99   BE -13     CBC:   Recent Labs  Lab  02/01/18  0535 02/01/18  1048 02/01/18  1053 02/01/18  1654   WBC 18.45* 19.73*  --  18.55*   HGB 10.5* 10.5*  --  10.3*   HCT 32.2* 32.8* 29* 33.3*   * 439*  --  302  302     CMP:   Recent Labs  Lab 02/01/18  1047 02/01/18  1332 02/01/18  1654   * 129* 130*   K 5.5* 4.9 5.9*   CL 95 98 102   CO2 12* 13* 10*   * 192* 166*   BUN 43* 42* 43*   CREATININE 1.7* 1.5* 1.5*   CALCIUM 9.6 8.7 8.6*   PROT 6.0  --   --    ALBUMIN 2.3*  --   --    BILITOT 1.3*  --   --    ALKPHOS 136*  --   --    AST 29  --   --    ALT 8*  --   --    ANIONGAP 23* 18* 18*   EGFRNONAA 27* 31* 31*     Lactic Acid:   Recent Labs  Lab 02/01/18  1047 02/01/18  1332 02/01/18  1654   LACTATE 11.0* 8.0* 8.7*     Magnesium:   Recent Labs  Lab 01/31/18  0538 02/01/18  0535   MG 1.9 2.8*     POCT Glucose:   Recent Labs  Lab 01/31/18  1640 02/01/18  1041   POCTGLUCOSE 150* 283*       Significant Imaging: I have reviewed all pertinent imaging results/findings within the past 24 hours.

## 2018-02-02 NOTE — EICU
Post-intubation CXR from 23:06 pm was personally reviewed.  The endotracheal tube is in acceptable position between the thoracic inlet and the oniel.  Right IJ central line is in acceptable position with no evidence pneumothorax.  The orogastric/nasogastric tube is in the esophagus but the distal tip is above the diaphragm.  Lung parenchyma are pretty clear with likely atelectasis in left base.    · Right IJ central line is cleared for use.  · Endotracheal tube okay.  · Orogastric tube should be advanced 6 cm.    Orders reviewed and include the following:  · Ventilator orders with  ml and rate 24.  · Mycafungin started for possible abdominal sepsis from fungal source.  · Titrate vasopressors based upon arterial line data.  · Soft wrist restraints placed to reduce the risk of self-harm while critically ill.

## 2018-02-02 NOTE — ANESTHESIA PROCEDURE NOTES
Arterial    Diagnosis: SEPSIS    Patient location during procedure: ICU  Staffing  Anesthesiologist: LORENE TEIXEIRA  Anesthesiologist was present at the time of the procedure.  Preanesthetic Checklist  Completed: patient identified, site marked, surgical consent, pre-op evaluation, timeout performed, IV checked, risks and benefits discussed, monitors and equipment checked and anesthesia consent givenArterial  Skin Prep: chlorhexidine gluconate and isopropyl alcohol  Local Infiltration: lidocaine  Orientation: right  Location: radial  Catheter Size: 20 G  Catheter placement by Ultrasound guidance. Heme positive aspiration all ports.Insertion Attempts: 2  Assessment  Dressing: secured with tape and tegaderm  Patient: Tolerated well

## 2018-02-02 NOTE — ASSESSMENT & PLAN NOTE
No evidence of volume overload, per daughter even usual edema is improved. 2D Echo 1/28/18 notes normal systolic function and no diastolic dysfunction.  Holding home furosemide for now.

## 2018-02-02 NOTE — ASSESSMENT & PLAN NOTE
Closed fracture of right hip  S/p ORIF on 1/27/2018, POD #5.  PT/OT recommend SNF.  Continue conservative pain mgmt with bowel regimen.

## 2018-02-02 NOTE — ASSESSMENT & PLAN NOTE
Has LLL segmental PTE on CTA today despite being on lovenox ppx. Started on heparin gtt, but stopped for surgery and held for now with acute blood loss anemia after the surgery. Monitor.

## 2018-02-03 ENCOUNTER — ANESTHESIA (OUTPATIENT)
Dept: SURGERY | Facility: HOSPITAL | Age: 83
End: 2018-02-03

## 2018-02-03 LAB
ANISOCYTOSIS BLD QL SMEAR: SLIGHT
BACTERIA UR CULT: NO GROWTH
BASOPHILS # BLD AUTO: ABNORMAL K/UL
BASOPHILS NFR BLD: 0 %
BURR CELLS BLD QL SMEAR: ABNORMAL
DIFFERENTIAL METHOD: ABNORMAL
EOSINOPHIL # BLD AUTO: ABNORMAL K/UL
EOSINOPHIL NFR BLD: 0 %
ERYTHROCYTE [DISTWIDTH] IN BLOOD BY AUTOMATED COUNT: 14.7 %
HCT VFR BLD AUTO: 27.3 %
HGB BLD-MCNC: 8.4 G/DL
HYPOCHROMIA BLD QL SMEAR: ABNORMAL
LYMPHOCYTES # BLD AUTO: ABNORMAL K/UL
LYMPHOCYTES NFR BLD: 46 %
MCH RBC QN AUTO: 31 PG
MCHC RBC AUTO-ENTMCNC: 30.8 G/DL
MCV RBC AUTO: 101 FL
METAMYELOCYTES NFR BLD MANUAL: 7 %
MONOCYTES # BLD AUTO: ABNORMAL K/UL
MONOCYTES NFR BLD: 0 %
MYELOCYTES NFR BLD MANUAL: 2 %
NEUTROPHILS NFR BLD: 16 %
NEUTS BAND NFR BLD MANUAL: 29 %
NRBC BLD-RTO: ABNORMAL /100 WBC
PLATELET # BLD AUTO: 50 K/UL
PLATELET BLD QL SMEAR: ABNORMAL
PMV BLD AUTO: 10.2 FL
POIKILOCYTOSIS BLD QL SMEAR: SLIGHT
POLYCHROMASIA BLD QL SMEAR: ABNORMAL
RBC # BLD AUTO: 2.71 M/UL
WBC # BLD AUTO: 15.21 K/UL

## 2018-02-03 NOTE — PROGRESS NOTES
2000- Post mortem care performed; lines and drains removed and patient cleaned for visitation per family request.     2330-[facility], Security dept notified.     0000- Security at bedside to retrieve patient's body.

## 2018-02-03 NOTE — OP NOTE
DATE OF PROCEDURE:  02/02/2018    SERVICE:  General Surgery.    ATTENDING PHYSICIAN:  Anthony Mclain M.D.    PREOPERATIVE DIAGNOSIS:  Intraabdominal sepsis.    POSTOPERATIVE DIAGNOSIS:  Necrosis of the colon and jejunum without perforation.    PROCEDURE:  Exploratory laparotomy, small bowel resection, total abdominal   colectomy, abdominal washout and placement of abdominal wound VAC.    INDICATIONS:  The patient is an 88-year-old female, who initially presented to   the Emergency Room at [place] for a right hip fracture sustained after a fall.    She underwent repair for that and was being observed in the ICU.  She does have   a history of hypertension, chronic kidney disease stage III, sick sinus syndrome   and had a pacemaker placed in 2015.  Also, has a history of pulmonary embolism.    She was in the ICU and began complaining of abdominal pain and noted to have   lactic acidosis.  General Surgery was consulted.  CT scan showed some   significantly dilated colon with large amount of stool with no evidence of any   perforation.  She continued to deteriorate over the course of the evening and   was taken to the Operating Room for exploration.    PROCEDURE IN DETAIL:  The patient was brought to the Operating Room, placed in a   supine position.  She was already endotracheally intubated from a code just   prior to the surgery and placed in supine position.  Abdomen was prepped and   draped in a sterile fashion with chlorhexidine prep.  A vertical midline   incision was made.  Electrocautery was used to dissect down the fascia into the   abdomen.  Upon entering the abdomen, a small amount of thin cloudy serous fluid   was noted, but no evidence of any stool or succus and no perforation was evident   at the time; however, the colon was significantly dilated and was dusky and   purplish throughout with patchy areas of gross necrosis.  The small bowel was   then run with a large portion of the jejunum appearing dusky  and an   approximately 40 cm segment grossly necrotic, but still without evidence of   perforation.  A ALFONZO blue load was then used to fire across the jejunum   approximately 30 cm distal to ligament of Treitz.  The LigaSure was then used to   divide the mesentery and an additional load of the ALFONZO stapler was then used to   fire across the small bowel for a total of approximately 80 cm of jejunum   resected.  Next, the rectum was identified and scored on both sides.  We were   able to fire a TIA stapler across and placed a stapler.  The proximal aspect of   the staple line and divided the rectum.  The LigaSure was then used to divide   the mesocolon and mobilize the colon upto the splenic flexure.  The cecum was   then identified.  The ileum appeared healthy.  A ALFONZO stapler was used to fire   across the terminal ileum and the combination of Bovie electrocautery and   LigaSure was used to divide the ascending and transverse mesocolon.  The colon   was then removed in entirety.  The stomach was then reduced into the abdomen.    She had an obvious hiatal hernia.  The stomach was examined, there is no   evidence of any perforation or necrosis in the stomach itself.  NG tube was   advanced into the stomach without any difficulty.  There were no gross   abnormalities noted.  Liver, spleen was palpated and felt normal.  The remainder   of the small bowel was left intact and in discontinuity.  The abdomen was then   irrigated with copious amounts of normal saline.  An abdominal wound VAC was   then placed.  The patient was then transferred back to the ICU in critical   condition.    COMPLICATIONS:  None.    ESTIMATED BLOOD LOSS: 100 mL.    SPECIMEN:  Colon and the segments of jejunum.      KENTRELL  dd: 02/02/2018 11:38:51 (CST)  td: 02/02/2018 18:23:12 (CST)  Doc ID   #[id number]  Job ID #[id number]    CC:

## 2018-02-05 ENCOUNTER — TELEPHONE (OUTPATIENT)
Dept: FAMILY MEDICINE | Facility: CLINIC | Age: 83
End: 2018-02-05

## 2018-02-05 LAB
HAV IGM SERPL QL IA: NEGATIVE
HBV CORE AB SERPL QL IA: NEGATIVE
HBV CORE IGM SERPL QL IA: NEGATIVE
HBV SURFACE AG SERPL QL IA: NEGATIVE
HCV AB SERPL QL IA: NEGATIVE
HEP. B SURF AB, QUAL: POSITIVE
HEP. B SURF AB, QUANT.: 29 MIU/ML

## 2018-02-05 NOTE — TELEPHONE ENCOUNTER
----- Message from Janki Caryn sent at 2/5/2018  9:35 AM CST -----  Contact: Gricelda, daughter,[phone]  States she had a missed call and thinks it might have been from your office. Please advise.

## 2018-02-05 NOTE — HOSPITAL COURSE
She remained in the ICU after repair of her hip fracture. On POD 5 surgery was consulted due to abdominal pain and distention. CT scan showed large amount of stool but no evidence of perforation. She was given enemas and laxatives to help pass stool. A rectal tube was placed which assisted in evauation of a large amount of stool. Later that evening she became more acidotic, unresponsive and hypotensive. She was found to be in PEA and chest compressions were initiated. They were able to regain a pulse. She was taken to the operating room that night and found to have necrotic colon and part of her small bowel. She underwent colectomy and small bowel resection and was sent back to the ICU. She remained on levophed requiring higher doses once dialysis was started. Later that evening she lost pulses again and was found to be in asystole. The code team was unable to regain circulation and she was pronounced .

## 2018-02-05 NOTE — DISCHARGE SUMMARY
[facility]  General Surgery  Discharge Summary      Patient Name: Esha Torres  MRN: [id number]  Admission Date: 1/26/2018  Hospital Length of Stay: 7 days  Discharge Date and Time: 2/2/2018 11:00 PM  Attending Physician: Rhonda att. providers found   Discharging Provider: Anthony Mclain MD  Primary Care Provider: Chad Barrera MD    HPI:   88F with htn, heart failure presented to [place] ED after suffering a fall at home. She was found to have a right hip fracture and was taken to the OR by Dr Lynn for repair.    Procedure(s) (LRB):  EXPLORATORY-LAPAROTOMY (N/A)  RESECTION-BOWEL (N/A)      Indwelling Lines/Drains at time of discharge:   Lines/Drains/Airways     Central Venous Catheter Line                 Percutaneous Central Line Insertion/Assessment - triple lumen  02/01/18 2241 right internal jugular 3 days         Trialysis (Dialysis) Catheter 02/02/18 1030 left internal jugular 2 days          Drain                 Rectal Tube 02/01/18 0200 fecal management system 4 days         Urethral Catheter 01/31/18 1728 Latex 14 Fr. 4 days         NG/OG Tube 02/02/18 0328 [place] sump 18 Fr. Right nostril 3 days          Airway                 Airway - Non-Surgical 02/01/18 2100 Endotracheal Tube 3 days          Arterial Line                 Arterial Line 02/01/18 2246 Right Radial 3 days          Pressure Ulcer                 Negative Pressure Wound Therapy  3 days          Surgical Packing                 Surgical Packing 3 days              Hospital Course: She remained in the ICU after repair of her hip fracture. On POD 5 surgery was consulted due to abdominal pain and distention. CT scan showed large amount of stool but no evidence of perforation. She was given enemas and laxatives to help pass stool. A rectal tube was placed which assisted in evauation of a large amount of stool. Later that evening she became more acidotic, unresponsive and hypotensive. She was found to be in PEA and chest  compressions were initiated. They were able to regain a pulse. She was taken to the operating room that night and found to have necrotic colon and part of her small bowel. She underwent colectomy and small bowel resection and was sent back to the ICU. She remained on levophed requiring higher doses once dialysis was started. Later that evening she lost pulses again and was found to be in asystole. The code team was unable to regain circulation and she was pronounced .     Consults:   Consults         Status Ordering Provider     Inpatient consult to General Surgery  Once     Provider:  Rosenda Armenta MD    Completed YVETTE CRANE     Inpatient consult to [facility] (Opal Michaud, [place])  Once     Provider:  (Not yet assigned)    Completed ROSENDA ARMENTA     Inpatient consult to Orthopedics  Once     Provider:  Uziel Lynn Jr., MD    Completed YOHANA CHAVEZ     Inpatient consult to Pulmonology  Once     Provider:  (Not yet assigned)    Completed YVETTE CRANE          Significant Diagnostic Studies: Labs: CMP No results for input(s): NA, K, CL, CO2, GLU, BUN, CREATININE, CALCIUM, PROT, ALBUMIN, BILITOT, ALKPHOS, AST, ALT, ANIONGAP, ESTGFRAFRICA, EGFRNONAA in the last 48 hours., CBC No results for input(s): WBC, HGB, HCT, PLT in the last 48 hours. and All labs within the past 24 hours have been reviewed  Radiology: CT scan: CT ABDOMEN PELVIS WITH CONTRAST: No results found for this visit on 18. and CT ABDOMEN PELVIS WITHOUT CONTRAST:   Results for orders placed or performed during the hospital encounter of 18   CT Abdomen Pelvis  Without Contrast    Narrative    CT abdomen and pelvis    Technique: Helical CT scan abdomen and pelvis performed without contrast.    Comparison: None.    Findings:    Bibasilar atelectasis noted.  No pericardial effusion.  Small pericardial effusion noted. Large fluid-filled hiatal hernia demonstrates mass effect upon the left  atrium.    Evaluation of solid organs limited without intravenous contrast.  Liver, pancreas, spleen, adrenal glands, and kidneys are unremarkable.  No hydronephrosis.  Status post cholecystectomy.  Dilated common bile duct measuring 1.5 cm may reflect prior instrumentation.    There is dilatation of stomach and small bowel.  Note made of multiple air-fluid levels, including within the stomach.  Small bowel feces noted.  No clear transition point demonstrated.  Additionally, there is a large volume of stool within the rectum resulting in 9cm distention and mild wall thickening.  Rectal tube noted.  There is nonspecific gaseous distention of the transverse colon and cecum.    No retroperitoneal or mesenteric lymphadenopathy.  Trace free fluid noted.  Abdominal aorta is normal caliber.    Uterus is diminutive or absent.  No adnexal masses.    Regional osseous structures demonstrate no aggressive appearing lytic or blastic lesions.  Status post gamma nail fixation of intertrochanteric right hip fracture.  Degenerative changes of the lumbar spine noted.    Impression    1.  Copious stool in the rectum resulting in distention up to 9 cm mild wall thickening.  2.  Diffuse distention of stomach and small bowel without clear transition point, ileus favored over small bowel obstruction.  3.  Large fluid-filled hiatal hernia.    Discussed with Dr. Del Castillo at 11:50 AM.      Electronically signed by: SERA MANZANO MD  Date:     02/01/18  Time:    11:52        Pending Diagnostic Studies:     Procedure Component Value Units Date/Time    Hepatitis B core antibody, total [[id number]] Collected:  02/02/18 1322    Order Status:  Sent Lab Status:  In process Updated:  02/02/18 1839    Specimen:  Blood from Blood     Hepatitis panel, acute [[id number]] Collected:  02/02/18 1322    Order Status:  Sent Lab Status:  In process Updated:  02/02/18 1839    Specimen:  Blood from Blood         Final Active Diagnoses:    Diagnosis Date Noted POA     PRINCIPAL PROBLEM:  Intertrochanteric fracture of right femur [S72.141A] 2018 Yes    Ischemic colitis, enteritis, or enterocolitis [K55.9] 2018 No    Hyperkalemia [E87.5] 2018 No    Hyponatremia [E87.1] 2018 No    Lactic acidosis [E87.2] 2018 No    [place]'s syndrome [K59.8] 2018 No    Constipation [K59.00] 2018 Yes    ESPERANZA (acute kidney injury) [N17.9] 2018 No    Postoperative anemia due to acute blood loss [D62] 2018 No    Abrasion of forearm [S50.819A] 2018 Yes    Acute pulmonary embolism [I26.99] 2018 Yes    Closed fracture of right hip [S72.001A] 2018 Yes    CKD (chronic kidney disease), stage III [N18.3] 2017 Yes     Chronic    Chronic diastolic heart failure [I50.32] 2016 Yes     Chronic    SSS (sick sinus syndrome) [I49.5] 2016 Yes     Chronic    Senile osteoporosis [M81.0] 2016 Yes     Chronic    Gastroesophageal reflux disease without esophagitis [K21.9] 2016 Yes     Chronic    Cardiac pacemaker in situ [Z95.0] 2015 Yes     Chronic    Mobitz type 1 second degree AV block [I44.1] 2014 Yes     Chronic    Benign essential hypertension [I10] 2012 Yes     Chronic      Problems Resolved During this Admission:    Diagnosis Date Noted Date Resolved POA      Discharged Condition:     Disposition:     Follow Up:  Follow-up Information     Uziel Lynn Jr, MD In 10 days.    Specialties:  Hand Surgery, Orthopedic Surgery  Why:  post-op follow-up, nurse will call daughter with appointment   Contact information:  [address]  [address]  [place] [id number] [phone]             Chad Barrera MD.    Specialty:  Internal Medicine  Contact information:  [address]  [address]  [place] [address]  [phone]             [facility] On 2018.    Specialties:  Nursing Home Agency, SNF Agency  Why:  SNF  Contact information:  [address]  [place] [address]  [phone]                 Patient Instructions:     Diet Adult Regular     Vital signs per facility protocol     Intake and output per facility protocol     Skin assessment every shift      Notify Physician   Order Specific Question Answer Comments   Temperature (F) greater than 101    Systolic Blood Pressure SBP greater than or equal to 160    Systolic Blood Pressure SBP less than or equal to 90    Diastolic Blood Pressure DBP greater than or equal to 110    Diastolic Blood Pressure DBP less than or equal to 60    Pulse greater than or equal to 120    Pulse less than or equal to 50    Respirations Rate RR greater than or equal to 30    Respirations Rate RR less than or equal to 6    Urine output less than 60 over 2 hours   SPO2% less than 90      Weight bearing as tolerated     Up in chair/ wheel chair     Full code     Pulse Oximetry       Medications:  None (patient  at medical facility)  Time spent on the discharge of patient: 20 minutes    Anthony Mclain MD  General Surgery  [facility]

## 2018-02-05 NOTE — HPI
88F with htn, heart failure presented to [place] ED after suffering a fall at home. She was found to have a right hip fracture and was taken to the OR by Dr Lynn for repair.

## 2018-02-06 NOTE — SIGNIFICANT EVENT
A code blue was called overhead, on arrival to bedside chest compression were in process. PEA was noted as the initial rhythm. She had a PM that was providing electrical activity without capture. 3 rounds of asystole/PEA ACLS were completed with 2 doses of 1mg epi. Bicarb and calcium chloride were also given during the course of the code. No shockable rhythm was identified. Primary team was at the bedside and decision was made to stop ACLS at 1901. Time of death was pronounced at that time.     Reynaldo Jones MD  [facility] Pulmonary and Critical Care Fellow  Pager: [phone]  Cell: [phone]

## 2018-02-07 NOTE — SIGNIFICANT EVENT
Called to ICU for code blue.  Pt became unresponsive and found to be in PEA.  CPR per ACLS protocol; see nurses notes for details.  Pt with ROSC.  She then required intubation.  RSI with etomidate and succinylcholine.  Pt was intubated with 7.5 ETT.  MAC 4 blade used, cords visualized and tube passed between cords.  Equal breath sounds, equal chest rise, no sounds over epigastrium.  +color change on CO2 detector.  Care then turned over to [facility] at bedside.  Family updated on pt status.

## 2018-02-08 NOTE — PHYSICIAN QUERY
PT Name: Esha Torres  MR #: [id number]    Physician Query Form - Consultant Diagnosis Clarification     CDS/: Aylin Adan RN               Contact information:[email or web address]  This form is a permanent document in the medical record.     Query Date: February 8, 2018      By submitting this query, we are merely seeking further clarification of documentation.  Please utilize your independent clinical judgment when addressing the question(s) below.      The Medical record contains the following:   Diagnosis Supporting Clinical Information Location in Medical Record   Hypoxic Acute Resp failure Called to ICU for code blue.  Pt became unresponsive and found to be in PEA.  CPR per ACLS protocol; see nurses notes for details.  Pt with ROSC.  She then required intubation.  RSI with etomidate and succinylcholine.  Pt was intubated with 7.5 ETT    Acute hypoxemic respiratory failure: unable to tolerate LPV due to profound acidemia. PE: continue heparin gtt. Ext: warm but cyanotic/mottled with pulses palpable. Sepsis 2/2 Ischemic Bowel s/p Total Colectomy and Partial Jejunal Resection now with Worsening Lactic Acidosis and Renal Failure on CRRT post-Arrest. most recent ABG [id number]  lactate >12  increasing pressor requirements now on max dose norepi and vasopressin  - AC 32 (35), 500 (275 for 6cc/kg), 8, 50%  recommend reducing TV for lung protection Emergency medicine provider 2/2            Pulmonary consult 2/2         Do you agree with the Consultants diagnosis of _Hypoxic Acute Respiratory Failure___?                 [ x  ]   Yes               [   ]   Yes, but it resolved prior to my assessment of the patient               [   ]   No                [   ]   Clinically insignificant               [   ]   Clinically undetermined               [   ]   Other/Clarification of findings: ___________________________________________

## 2018-02-08 NOTE — PHYSICIAN QUERY
PT Name: Esha Torres  MR #: [id number]    Physician Query Form - Consultant Diagnosis Clarification     CDS/: Aylin Adan RN               Contact information:fransisca  This form is a permanent document in the medical record.     Query Date: February 8, 2018      By submitting this query, we are merely seeking further clarification of documentation.  Please utilize your independent clinical judgment when addressing the question(s) below.      The Medical record contains the following:   Diagnosis Supporting Clinical Information Location in Medical Record   Septic Shock  Blood pressure 89/61 On norepinephrine, vasopressin.    Hypotensive. Holding home lisinopril for ESPERANZA and recently added scheduled hydralazine.    Septic shock: continue levophed for goal MAPs >65. Appears volume replete. Trend lactate    Called to ICU for code blue.  Pt became unresponsive and found to be in PEA.  CPR per ACLS protocol; see nurses notes for details.  Pt with ROSC.  She then required intubation.  RSI with etomidate and succinylcholine.  Pt was intubated with 7.5 ETT Nephrology consult 2/2 @ 0928    Hospital Medicine PN 2/2@ 5:40p    Pulmonary consult 2/2      Emergency medicine provider 2/2         Do you agree with the Consultants diagnosis of _Septic shock____?                 [ x  ]   Yes               [   ]   Yes, but it resolved prior to my assessment of the patient               [   ]   No                [   ]   Clinically insignificant               [   ]   Clinically undetermined               [   ]   Other/Clarification of findings: ___________________________________________

## 2018-02-15 LAB
GLUCOSE SERPL-MCNC: <20 MG/DL (ref 70–110)
GLUCOSE SERPL-MCNC: <20 MG/DL (ref 70–110)
HCO3 UR-SCNC: 10 MMOL/L (ref 24–28)
HCO3 UR-SCNC: 11.5 MMOL/L (ref 24–28)
HCT VFR BLD CALC: 19 %PCV (ref 36–54)
HCT VFR BLD CALC: 26 %PCV (ref 36–54)
HGB BLD-MCNC: 7 G/DL
HGB BLD-MCNC: 9 G/DL
PCO2 BLDA: 43.2 MMHG (ref 35–45)
PCO2 BLDA: 45.3 MMHG (ref 35–45)
PH SMN: 6.95 [PH] (ref 7.35–7.45)
PH SMN: 7.03 [PH] (ref 7.35–7.45)
PO2 BLDA: 118 MMHG (ref 80–100)
PO2 BLDA: 139 MMHG (ref 80–100)
POC BE: -19 MMOL/L
POC BE: -22 MMOL/L
POC IONIZED CALCIUM: 0.89 MMOL/L (ref 1.06–1.42)
POC IONIZED CALCIUM: 1 MMOL/L (ref 1.06–1.42)
POC SATURATED O2: 95 % (ref 95–100)
POC SATURATED O2: 98 % (ref 95–100)
POC TCO2: 11 MMOL/L (ref 23–27)
POC TCO2: 13 MMOL/L (ref 23–27)
POTASSIUM BLD-SCNC: 6 MMOL/L (ref 3.5–5.1)
POTASSIUM BLD-SCNC: 6.3 MMOL/L (ref 3.5–5.1)
SAMPLE: ABNORMAL
SAMPLE: ABNORMAL
SODIUM BLD-SCNC: 135 MMOL/L (ref 136–145)
SODIUM BLD-SCNC: 135 MMOL/L (ref 136–145)

## 2018-12-20 NOTE — PT/OT/SLP PROGRESS
Physical Therapy Treatment    Patient Name:  Esha Torres   MRN:  [id number]    Recommendations:     Discharge Recommendations:  nursing facility, skilled   Discharge Equipment Recommendations:  (defer to SNF)   Barriers to discharge: Inaccessible home and Decreased caregiver support    Assessment:     Esha Torres is a 88 y.o. female admitted with a medical diagnosis of Intertrochanteric fracture of right femur.  She presents with the following impairments/functional limitations:  weakness, impaired endurance, impaired functional mobilty, decreased lower extremity function, pain, decreased ROM, orthopedic precautions pt tolerated gentle AA there ex w/o much pain,rest periods throughout,will benefit from SNF.    Rehab Prognosis:  Good; patient would benefit from acute skilled PT services to address these deficits and reach maximum level of function.      Recent Surgery: Procedure(s) (LRB):  OPEN REDUCTION INTERNAL FIXATION-HIP-INTERTROCHANTERIC (Right) 3 Days Post-Op    Plan:     During this hospitalization, patient to be seen BID (M-F) to address the above listed problems via gait training, therapeutic activities, therapeutic exercises  · Plan of Care Expires:  02/28/18   Plan of Care Reviewed with: patient, daughter    Subjective     Communicated with nsg prior to session.  Patient found supine upon PT entry to room, agreeable to treatment.      Chief Complaint: pt has been urinating on herself and is frustrated  Patient comments/goals: pt is pleased with ability to move R le with help  Pain/Comfort:  · Pain Rating 1: 0/10 (at rest)    Patients cultural, spiritual, Yazdanism conflicts given the current situation: none    Objective:     Patient found with: SCD, telemetry     General Precautions: Standard, fall   Orthopedic Precautions:RLE weight bearing as tolerated   Braces: N/A     Functional Mobility:  · Bed Mobility:     · Supine to Sit: n/a      AM-PAC 6 CLICK MOBILITY  Turning over in bed (including  adjusting bedclothes, sheets and blankets)?: 2  Sitting down on and standing up from a chair with arms (e.g., wheelchair, bedside commode, etc.): 2  Moving from lying on back to sitting on the side of the bed?: 2  Moving to and from a bed to a chair (including a wheelchair)?: 1  Need to walk in hospital room?: 1  Climbing 3-5 steps with a railing?: 1  Total Score: 9       Therapeutic Activities and Exercises: le AA supine ex's with assistance on R inc: ap,qs,hs,abd/add,slr,rest periods throughout,pt received Tylenol prior to rx       Patient left supine with all lines intact, call button in reach, nsg notified and daughters present..    GOALS: see general POC   Physical Therapy Goals        Problem: Physical Therapy Goal    Goal Priority Disciplines Outcome Goal Variances Interventions   Physical Therapy Goal     PT/OT, PT Ongoing (interventions implemented as appropriate)     Description:  Goals to be met by: 2018     Patient will increase functional independence with mobility by performin. Supine to sit with MInimal Assistance  2. Sit to supine with MInimal Assistance  3. Sit to stand transfer with Minimal Assistance  4. Bed to chair transfer with Minimal Assistance using Rolling Walker  5. Gait  x 75 feet with Minimal Assistance using Rolling Walker.   6. Ascend/Descend 5 inch curb step with Minimal Assistance using Rolling Walker if able  7. Lower extremity exercise program x10 reps with assistance as needed                      Time Tracking:     PT Received On: 18  PT Start Time: 1450     PT Stop Time: 1517  PT Total Time (min): 27 min     Billable Minutes: Therapeutic Exercise 27    Treatment Type: Treatment  PT/PTA: PTA     PTA Visit Number: 0     Clemente Ramsay, NISHA  2018   21 y/o female with diagnosis of cyclic vomiting, to be admitted for dehydration and weakness.    IMPROVE VTE Individual Risk Assessment          RISK                                                          Points    [  ] Previous VTE                                                3    [  ] Thrombophilia                                             2    [  ] Lower limb paralysis                                   2        (unable to hold up >15 seconds)      [  ] Current Cancer                                             2         (within 6 months)    [ x ] Immobilization > 24 hrs                              1    [  ] ICU/CCU stay > 24 hours                            1    [  ] Age > 60                                                        1    IMPROVE VTE Score ___1______

## 2020-04-03 NOTE — PLAN OF CARE
Problem: Occupational Therapy Goal  Goal: Occupational Therapy Goal  Goals to be met by: 2/28/2018     Patient will increase functional independence with ADLs by performing:    UE Dressing with Stand-by Assistance.  LE Dressing with Minimal Assistance and Moderate Assistance.  Grooming while EOB with Supervision.  Toileting from bedside commode with Minimal Assistance for hygiene and clothing management.   Bathing from  edge of bed with Minimal Assistance.  Toilet transfer to bedside commode with Minimal Assistance.    Has rollator, grab bars, shower chair, & quad cane.  DME & post-acute therapy needs TBD pending progress with OT.      Outcome: Ongoing (interventions implemented as appropriate)  Pt progressing well towards goals. Able to tolerate 2 stands this date from EOB. Cont OT POC       Alert-The patient is alert, awake and responds to voice. The patient is oriented to time, place, and person. The triage nurse is able to obtain subjective information.

## (undated) DEVICE — PAD ABDOMINAL 5X9 STERILE

## (undated) DEVICE — SEE MEDLINE ITEM 157117

## (undated) DEVICE — CLOSURE SKIN STERI STRIP 1/2X4

## (undated) DEVICE — SOL BETADINE 5%

## (undated) DEVICE — SEE MEDLINE ITEM 156955

## (undated) DEVICE — GLOVE SURG BIOGEL LATEX SZ 7.5

## (undated) DEVICE — STAPLER SKIN ROTATING HEAD

## (undated) DEVICE — Device

## (undated) DEVICE — STAPLER INT PROX TX 60X3.5MM

## (undated) DEVICE — BLADE SURG CARBON STEEL #10

## (undated) DEVICE — CUTTER PROXIMATE BLUE 55MM

## (undated) DEVICE — SUPPORT ULNA NERVE PROTECTOR

## (undated) DEVICE — APPLICATOR CHLORAPREP ORN 26ML

## (undated) DEVICE — SPONGE LAP 18X18 PREWASHED

## (undated) DEVICE — CUTTER PROXIMATE BLUE 75MM

## (undated) DEVICE — ALCOHOL 70% ISOP W/GREEN 16OZ

## (undated) DEVICE — SPONGE DERMACEA GAUZE 4X4

## (undated) DEVICE — PADDING CAST SPECIALIST 6X4YD

## (undated) DEVICE — PACK BASIC

## (undated) DEVICE — DRESSING TRANS 4X4 3/4

## (undated) DEVICE — GLOVE SURGICAL LATEX SZ 7

## (undated) DEVICE — SEE MEDLINE ITEM 152622

## (undated) DEVICE — SEE MEDLINE ITEM 146292

## (undated) DEVICE — DRAPE STERI-DRAPE 83X125 IOBAN

## (undated) DEVICE — CANISTER INFOV.A.C WOUND 500ML

## (undated) DEVICE — DRAPE FLUID WARMER ORS 44X44IN

## (undated) DEVICE — SEE MEDLINE ITEM 152530

## (undated) DEVICE — GAUZE SPONGE 4X4 12PLY

## (undated) DEVICE — COVER OVERHEAD SURG LT BLUE

## (undated) DEVICE — DRESSING XEROFORM 1X8IN

## (undated) DEVICE — ADHESIVE MASTISOL VIAL 48/BX

## (undated) DEVICE — SUT VICRYL 1 CT-1 27 UNDIE

## (undated) DEVICE — SUT VICRYL CTD 2-0 GI 27 SH

## (undated) DEVICE — SEE MEDLINE ITEM 157116

## (undated) DEVICE — CONTAINER PATHOLOGY W/LID 32OZ

## (undated) DEVICE — ELECTRODE REM PLYHSV RETURN 9

## (undated) DEVICE — DRESSING XEROFORM FOIL PK 1X8

## (undated) DEVICE — RELOAD PROXIMATE CUT BLUE 75MM

## (undated) DEVICE — BIT DRILL 3FLUTE 4.2X330 SS ST

## (undated) DEVICE — SEALER LIGASURE IMPACT 18CM

## (undated) DEVICE — REAMER STEPPED DHS DCS

## (undated) DEVICE — SEE MEDLINE ITEM 157148

## (undated) DEVICE — MANIFOLD 4 PORT